# Patient Record
Sex: FEMALE | Race: BLACK OR AFRICAN AMERICAN | Employment: OTHER | URBAN - METROPOLITAN AREA
[De-identification: names, ages, dates, MRNs, and addresses within clinical notes are randomized per-mention and may not be internally consistent; named-entity substitution may affect disease eponyms.]

---

## 2017-06-30 ENCOUNTER — HOSPITAL ENCOUNTER (OUTPATIENT)
Dept: GENERAL RADIOLOGY | Age: 42
Discharge: HOME | End: 2017-06-30
Payer: MEDICARE

## 2017-06-30 ENCOUNTER — HOSPITAL ENCOUNTER (OUTPATIENT)
Dept: MRI IMAGING | Age: 42
Discharge: HOME | End: 2017-06-30
Payer: MEDICARE

## 2017-06-30 DIAGNOSIS — M51.37 DEGENERATION OF LUMBAR OR LUMBOSACRAL INTERVERTEBRAL DISC: ICD-10-CM

## 2017-06-30 PROCEDURE — 72148 MRI LUMBAR SPINE W/O DYE: CPT

## 2018-02-02 ENCOUNTER — HOSPITAL ENCOUNTER (EMERGENCY)
Age: 43
Discharge: HOME | End: 2018-02-02
Payer: MEDICARE

## 2018-02-02 VITALS
OXYGEN SATURATION: 97 % | HEIGHT: 67 IN | WEIGHT: 190 LBS | SYSTOLIC BLOOD PRESSURE: 112 MMHG | BODY MASS INDEX: 29.82 KG/M2 | HEART RATE: 104 BPM | TEMPERATURE: 98.4 F | RESPIRATION RATE: 18 BRPM | DIASTOLIC BLOOD PRESSURE: 74 MMHG

## 2018-02-02 DIAGNOSIS — T78.40XA ALLERGIC REACTION, INITIAL ENCOUNTER: Primary | ICD-10-CM

## 2018-02-02 PROCEDURE — 6370000000 HC RX 637 (ALT 250 FOR IP): Performed by: PHYSICIAN ASSISTANT

## 2018-02-02 PROCEDURE — 96374 THER/PROPH/DIAG INJ IV PUSH: CPT

## 2018-02-02 PROCEDURE — 2580000003 HC RX 258: Performed by: PHYSICIAN ASSISTANT

## 2018-02-02 PROCEDURE — S0028 INJECTION, FAMOTIDINE, 20 MG: HCPCS | Performed by: PHYSICIAN ASSISTANT

## 2018-02-02 PROCEDURE — 6360000002 HC RX W HCPCS: Performed by: PHYSICIAN ASSISTANT

## 2018-02-02 PROCEDURE — 96375 TX/PRO/DX INJ NEW DRUG ADDON: CPT

## 2018-02-02 PROCEDURE — 99282 EMERGENCY DEPT VISIT SF MDM: CPT

## 2018-02-02 PROCEDURE — 2500000003 HC RX 250 WO HCPCS: Performed by: PHYSICIAN ASSISTANT

## 2018-02-02 RX ORDER — KETOROLAC TROMETHAMINE 30 MG/ML
30 INJECTION, SOLUTION INTRAMUSCULAR; INTRAVENOUS ONCE
Status: COMPLETED | OUTPATIENT
Start: 2018-02-02 | End: 2018-02-02

## 2018-02-02 RX ORDER — SODIUM CHLORIDE 9 MG/ML
INJECTION, SOLUTION INTRAVENOUS CONTINUOUS
Status: DISCONTINUED | OUTPATIENT
Start: 2018-02-02 | End: 2018-02-02 | Stop reason: HOSPADM

## 2018-02-02 RX ORDER — PREDNISONE 20 MG/1
20 TABLET ORAL DAILY
Qty: 5 TABLET | Refills: 0 | Status: SHIPPED | OUTPATIENT
Start: 2018-02-02 | End: 2018-02-07

## 2018-02-02 RX ORDER — LORATADINE 10 MG/1
10 TABLET ORAL DAILY
Qty: 30 TABLET | Refills: 0 | Status: SHIPPED | OUTPATIENT
Start: 2018-02-02

## 2018-02-02 RX ORDER — DIPHENHYDRAMINE HYDROCHLORIDE 50 MG/ML
25 INJECTION INTRAMUSCULAR; INTRAVENOUS ONCE
Status: COMPLETED | OUTPATIENT
Start: 2018-02-02 | End: 2018-02-02

## 2018-02-02 RX ORDER — FAMOTIDINE 20 MG/1
20 TABLET, FILM COATED ORAL 2 TIMES DAILY
Qty: 20 TABLET | Refills: 0 | Status: SHIPPED | OUTPATIENT
Start: 2018-02-02

## 2018-02-02 RX ORDER — METHYLPREDNISOLONE SODIUM SUCCINATE 125 MG/2ML
125 INJECTION, POWDER, LYOPHILIZED, FOR SOLUTION INTRAMUSCULAR; INTRAVENOUS ONCE
Status: COMPLETED | OUTPATIENT
Start: 2018-02-02 | End: 2018-02-02

## 2018-02-02 RX ADMIN — SODIUM CHLORIDE: 9 INJECTION, SOLUTION INTRAVENOUS at 12:00

## 2018-02-02 RX ADMIN — Medication 15 ML: at 12:09

## 2018-02-02 RX ADMIN — KETOROLAC TROMETHAMINE 30 MG: 30 INJECTION, SOLUTION INTRAMUSCULAR at 13:02

## 2018-02-02 RX ADMIN — FAMOTIDINE 20 MG: 10 INJECTION, SOLUTION INTRAVENOUS at 12:01

## 2018-02-02 RX ADMIN — METHYLPREDNISOLONE SODIUM SUCCINATE 125 MG: 125 INJECTION, POWDER, FOR SOLUTION INTRAMUSCULAR; INTRAVENOUS at 12:01

## 2018-02-02 RX ADMIN — DIPHENHYDRAMINE HYDROCHLORIDE 25 MG: 50 INJECTION, SOLUTION INTRAMUSCULAR; INTRAVENOUS at 12:01

## 2018-02-02 ASSESSMENT — PAIN SCALES - GENERAL
PAINLEVEL_OUTOF10: 8
PAINLEVEL_OUTOF10: 4

## 2018-02-02 ASSESSMENT — ENCOUNTER SYMPTOMS
SHORTNESS OF BREATH: 0
RHINORRHEA: 0
EYE DISCHARGE: 0
COLOR CHANGE: 0
CONSTIPATION: 0
ABDOMINAL DISTENTION: 0
ABDOMINAL PAIN: 0
SORE THROAT: 0

## 2018-02-02 NOTE — ED PROVIDER NOTES
(36.9 °C) Oral 104 18 97 % 5' 7\" (1.702 m) 190 lb (86.2 kg)       Physical Exam   Constitutional: She is oriented to person, place, and time. She appears well-developed and well-nourished. HENT:   Head: Normocephalic. Eyes: EOM are normal. Pupils are equal, round, and reactive to light. Neck: Normal range of motion. Neck supple. No JVD present. No tracheal deviation present. Cardiovascular: Normal rate. Pulmonary/Chest: Effort normal. No respiratory distress. She has no wheezes. She has no rales. She exhibits no tenderness. Lungs sounds are clear in all fields no wheezes rales or rhonchi there is no accessory muscle use there is no signs of respiratory distress and room air saturation is 97%. Abdominal: She exhibits no distension and no mass. There is no tenderness. There is no guarding. Musculoskeletal: She exhibits no edema or deformity. Patient is able to with very steady gait there is no ataxia there is no dizziness. Neurological: She is oriented to person, place, and time. Coordination normal.   Skin: Rash noted. Patient has urticaria noted over dorsal surface of her hands and forearms chest and neck. There is no signs of allergic reaction within the mouth there is no blistering there is no tongue swelling there is no swelling to her lips. Psychiatric: She has a normal mood and affect.        DIAGNOSTIC RESULTS     EKG: All EKG's are interpreted by the Emergency Department Physician who either signs or Co-signs this chart in the absence of a cardiologist.        RADIOLOGY:   Non-plain film images such as CT, Ultrasound and MRI are read by the radiologist. Plain radiographic images are visualized and preliminarily interpreted by the emergency physician with the below findings:        Interpretation per the Radiologist below, if available at the time of this note:    No orders to display         ED BEDSIDE ULTRASOUND:   Performed by ED Physician - none    LABS:  Labs Reviewed - No data to display    All other labs were within normal range or not returned as of this dictation. EMERGENCY DEPARTMENT COURSE and DIFFERENTIAL DIAGNOSIS/MDM:   Vitals:    Vitals:    02/02/18 1135   BP: 112/74   Pulse: 104   Resp: 18   Temp: 98.4 °F (36.9 °C)   TempSrc: Oral   SpO2: 97%   Weight: 190 lb (86.2 kg)   Height: 5' 7\" (1.702 m)         ED Course      MDM  Number of Diagnoses or Management Options  Allergic reaction, initial encounter:   Diagnosis management comments: After medication in emergency murmur with Pepcid and Solu-Medrol and Benadryl rash is fading out at this time patient has no additional complaints no shortness of breath no dizziness no nausea or vomiting. There is no intraoral rashes or ulcerations. She'll be discharged home denies if she has worsening of her condition increasing shortness of breath difficulty swallowing worsening rash is not resolving she should return to the ER. CRITICAL CARE TIME   Total Critical Care time was 0 minutes, excluding separately reportable procedures. There was a high probability of clinically significant/life threatening deterioration in the patient's condition which required my urgent intervention. CONSULTS:  None    PROCEDURES:  Unless otherwise noted below, none     Procedures    FINAL IMPRESSION      1.  Allergic reaction, initial encounter          DISPOSITION/PLAN   DISPOSITION Decision To Discharge 02/02/2018 12:51:01 PM      PATIENT REFERRED TO:  Negrito Mo MD  [address] [place] [address] [phone]    In 2 days        DISCHARGE MEDICATIONS:  New Prescriptions    FAMOTIDINE (PEPCID) 20 MG TABLET    Take 1 tablet by mouth 2 times daily    LORATADINE (CLARITIN) 10 MG TABLET    Take 1 tablet by mouth daily    PREDNISONE (DELTASONE) 20 MG TABLET    Take 1 tablet by mouth daily for 5 doses          (Please note that portions of this note were completed with a voice recognition program.  Efforts were made to edit the dictations but

## 2018-04-11 PROBLEM — M46.1 SACROILIITIS, NOT ELSEWHERE CLASSIFIED (HCC): Status: ACTIVE | Noted: 2018-04-11

## 2018-05-24 ENCOUNTER — INITIAL CONSULT (OUTPATIENT)
Dept: OBGYN CLINIC | Age: 43
End: 2018-05-24
Payer: MEDICARE

## 2018-05-24 VITALS
WEIGHT: 220 LBS | DIASTOLIC BLOOD PRESSURE: 70 MMHG | SYSTOLIC BLOOD PRESSURE: 118 MMHG | HEIGHT: 66 IN | BODY MASS INDEX: 35.36 KG/M2

## 2018-05-24 DIAGNOSIS — Z01.419 WOMEN'S ANNUAL ROUTINE GYNECOLOGICAL EXAMINATION: Primary | ICD-10-CM

## 2018-05-24 DIAGNOSIS — Z11.51 SPECIAL SCREENING EXAMINATION FOR HUMAN PAPILLOMAVIRUS (HPV): ICD-10-CM

## 2018-05-24 DIAGNOSIS — Z12.31 SCREENING MAMMOGRAM, ENCOUNTER FOR: ICD-10-CM

## 2018-05-24 PROCEDURE — 99396 PREV VISIT EST AGE 40-64: CPT | Performed by: OBSTETRICS & GYNECOLOGY

## 2018-05-30 ASSESSMENT — ENCOUNTER SYMPTOMS
RECTAL PAIN: 0
ABDOMINAL DISTENTION: 0
ANAL BLEEDING: 0
ALLERGIC/IMMUNOLOGIC NEGATIVE: 1
RESPIRATORY NEGATIVE: 1
DIARRHEA: 0
ABDOMINAL PAIN: 0
CONSTIPATION: 0
BLOOD IN STOOL: 0
EYES NEGATIVE: 1
NAUSEA: 0
VOMITING: 0

## 2018-06-01 ENCOUNTER — HOSPITAL ENCOUNTER (OUTPATIENT)
Dept: GENERAL RADIOLOGY | Age: 43
Discharge: HOME | End: 2018-06-03
Payer: MEDICARE

## 2018-06-01 ENCOUNTER — HOSPITAL ENCOUNTER (OUTPATIENT)
Dept: WOMENS IMAGING | Age: 43
Discharge: HOME | End: 2018-06-03
Payer: MEDICARE

## 2018-06-01 DIAGNOSIS — M46.1 SACROILIITIS (HCC): ICD-10-CM

## 2018-06-01 DIAGNOSIS — Z12.31 SCREENING MAMMOGRAM, ENCOUNTER FOR: ICD-10-CM

## 2018-06-01 PROCEDURE — 72220 X-RAY EXAM SACRUM TAILBONE: CPT

## 2018-06-01 PROCEDURE — 77067 SCR MAMMO BI INCL CAD: CPT

## 2018-06-04 DIAGNOSIS — Z01.419 WOMEN'S ANNUAL ROUTINE GYNECOLOGICAL EXAMINATION: ICD-10-CM

## 2018-06-04 DIAGNOSIS — Z11.51 SPECIAL SCREENING EXAMINATION FOR HUMAN PAPILLOMAVIRUS (HPV): ICD-10-CM

## 2018-06-12 ENCOUNTER — TELEPHONE (OUTPATIENT)
Dept: OBGYN CLINIC | Age: 43
End: 2018-06-12

## 2019-07-16 ENCOUNTER — HOSPITAL ENCOUNTER (OUTPATIENT)
Dept: NON INVASIVE DIAGNOSTICS | Age: 44
Discharge: HOME | End: 2019-07-16
Payer: MEDICARE

## 2019-07-16 LAB
EKG ATRIAL RATE: 72 BPM
EKG P AXIS: 60 DEGREES
EKG P-R INTERVAL: 212 MS
EKG Q-T INTERVAL: 390 MS
EKG QRS DURATION: 68 MS
EKG QTC CALCULATION (BAZETT): 427 MS
EKG R AXIS: -13 DEGREES
EKG T AXIS: 4 DEGREES
EKG VENTRICULAR RATE: 72 BPM

## 2019-07-16 PROCEDURE — 93005 ELECTROCARDIOGRAM TRACING: CPT | Performed by: INTERNAL MEDICINE

## 2019-07-18 PROCEDURE — 93010 ELECTROCARDIOGRAM REPORT: CPT | Performed by: INTERNAL MEDICINE

## 2019-08-01 ENCOUNTER — OFFICE VISIT (OUTPATIENT)
Dept: CARDIOLOGY CLINIC | Age: 44
End: 2019-08-01
Payer: MEDICARE

## 2019-08-01 VITALS
OXYGEN SATURATION: 98 % | HEIGHT: 66 IN | SYSTOLIC BLOOD PRESSURE: 122 MMHG | RESPIRATION RATE: 16 BRPM | DIASTOLIC BLOOD PRESSURE: 68 MMHG | WEIGHT: 242.6 LBS | BODY MASS INDEX: 38.99 KG/M2 | HEART RATE: 107 BPM

## 2019-08-01 DIAGNOSIS — Z82.49 FAMILY HISTORY OF CORONARY ARTERY DISEASE: ICD-10-CM

## 2019-08-01 DIAGNOSIS — E66.01 MORBID OBESITY (HCC): ICD-10-CM

## 2019-08-01 DIAGNOSIS — R94.31 ABNORMAL EKG: ICD-10-CM

## 2019-08-01 PROCEDURE — 99204 OFFICE O/P NEW MOD 45 MIN: CPT | Performed by: INTERNAL MEDICINE

## 2019-08-01 PROCEDURE — G8417 CALC BMI ABV UP PARAM F/U: HCPCS | Performed by: INTERNAL MEDICINE

## 2019-08-01 PROCEDURE — 4004F PT TOBACCO SCREEN RCVD TLK: CPT | Performed by: INTERNAL MEDICINE

## 2019-08-01 PROCEDURE — G8427 DOCREV CUR MEDS BY ELIG CLIN: HCPCS | Performed by: INTERNAL MEDICINE

## 2019-08-01 NOTE — PROGRESS NOTES
activity: Not Currently   Lifestyle    Physical activity:     Days per week: None     Minutes per session: None    Stress: None   Relationships    Social connections:     Talks on phone: None     Gets together: None     Attends Yazidi service: None     Active member of club or organization: None     Attends meetings of clubs or organizations: None     Relationship status: None    Intimate partner violence:     Fear of current or ex partner: None     Emotionally abused: None     Physically abused: None     Forced sexual activity: None   Other Topics Concern    None   Social History Narrative    None       Family History   Adopted: Yes       Current Outpatient Medications   Medication Sig Dispense Refill    MULTIPLE VITAMIN PO Take by mouth      famotidine (PEPCID) 20 MG tablet Take 1 tablet by mouth 2 times daily 20 tablet 0    loratadine (CLARITIN) 10 MG tablet Take 1 tablet by mouth daily 30 tablet 0    tiZANidine (ZANAFLEX) 4 MG tablet TAKE 1 TABLET BY MOUTH TWICE DAILY AS NEEDED FOR SPASMS 60 tablet 0    oxyCODONE-acetaminophen (PERCOCET) 7.5-325 MG per tablet Take 1 tablet by mouth 2 times daily as needed for Pain . Earliest Fill Date: 12/8/17 60 tablet 0    oxyCODONE-acetaminophen (PERCOCET)  MG per tablet Take 1 tablet by mouth 2 times daily as needed for Pain . Earliest Fill Date: 5/24/17 4 tablet 0    fentaNYL (DURAGESIC) 12 MCG/HR Place 1 patch onto the skin every 72 hours . Earliest Fill Date: 3/30/17 10 patch 0    fentaNYL (DURAGESIC) 25 MCG/HR Place 1 patch onto the skin every 72 hours  For pain. 10 patch 0     No current facility-administered medications for this visit. Red dye; Flagyl [metronidazole]; and Vitamin b12    Review of Systems:  General ROS: negative  Psychological ROS: negative  Hematological and Lymphatic ROS: No history of blood clots or bleeding disorder.    Respiratory ROS: positive for - shortness of breath  Cardiovascular ROS: positive for - dyspnea on

## 2019-08-12 ENCOUNTER — HOSPITAL ENCOUNTER (OUTPATIENT)
Dept: NUCLEAR MEDICINE | Age: 44
Discharge: HOME | End: 2019-08-14
Payer: MEDICARE

## 2019-08-12 ENCOUNTER — HOSPITAL ENCOUNTER (OUTPATIENT)
Dept: NON INVASIVE DIAGNOSTICS | Age: 44
Discharge: HOME | End: 2019-08-12
Payer: MEDICARE

## 2019-08-12 DIAGNOSIS — E66.01 MORBID OBESITY (HCC): ICD-10-CM

## 2019-08-12 DIAGNOSIS — R94.31 ABNORMAL EKG: ICD-10-CM

## 2019-08-12 LAB
LV EF: 60 %
LVEF MODALITY: NORMAL

## 2019-08-12 PROCEDURE — 78452 HT MUSCLE IMAGE SPECT MULT: CPT

## 2019-08-12 PROCEDURE — 93017 CV STRESS TEST TRACING ONLY: CPT

## 2019-08-12 PROCEDURE — 6360000002 HC RX W HCPCS: Performed by: INTERNAL MEDICINE

## 2019-08-12 PROCEDURE — 2580000003 HC RX 258: Performed by: INTERNAL MEDICINE

## 2019-08-12 PROCEDURE — 3430000000 HC RX DIAGNOSTIC RADIOPHARMACEUTICAL: Performed by: INTERNAL MEDICINE

## 2019-08-12 PROCEDURE — A9502 TC99M TETROFOSMIN: HCPCS | Performed by: INTERNAL MEDICINE

## 2019-08-12 PROCEDURE — 93306 TTE W/DOPPLER COMPLETE: CPT

## 2019-08-12 RX ORDER — SODIUM CHLORIDE 0.9 % (FLUSH) 0.9 %
10 SYRINGE (ML) INJECTION PRN
Status: COMPLETED | OUTPATIENT
Start: 2019-08-12 | End: 2019-08-12

## 2019-08-12 RX ADMIN — TETROFOSMIN 34.7 MILLICURIE: 1.38 INJECTION, POWDER, LYOPHILIZED, FOR SOLUTION INTRAVENOUS at 12:10

## 2019-08-12 RX ADMIN — Medication 10 ML: at 10:35

## 2019-08-12 RX ADMIN — Medication 10 ML: at 12:10

## 2019-08-12 RX ADMIN — Medication 10 ML: at 12:11

## 2019-08-12 RX ADMIN — REGADENOSON 0.4 MG: 0.08 INJECTION, SOLUTION INTRAVENOUS at 12:10

## 2019-08-12 RX ADMIN — TETROFOSMIN 10.8 MILLICURIE: 1.38 INJECTION, POWDER, LYOPHILIZED, FOR SOLUTION INTRAVENOUS at 10:35

## 2019-08-26 ENCOUNTER — OFFICE VISIT (OUTPATIENT)
Dept: CARDIOLOGY CLINIC | Age: 44
End: 2019-08-26
Payer: MEDICARE

## 2019-08-26 VITALS
SYSTOLIC BLOOD PRESSURE: 122 MMHG | BODY MASS INDEX: 38.57 KG/M2 | HEART RATE: 82 BPM | DIASTOLIC BLOOD PRESSURE: 84 MMHG | WEIGHT: 240 LBS | OXYGEN SATURATION: 99 % | HEIGHT: 66 IN | RESPIRATION RATE: 20 BRPM

## 2019-08-26 DIAGNOSIS — E66.01 MORBID OBESITY (HCC): ICD-10-CM

## 2019-08-26 DIAGNOSIS — R94.31 ABNORMAL EKG: Primary | ICD-10-CM

## 2019-08-26 DIAGNOSIS — Z87.891 HISTORY OF TOBACCO ABUSE: ICD-10-CM

## 2019-08-26 PROCEDURE — G8427 DOCREV CUR MEDS BY ELIG CLIN: HCPCS | Performed by: INTERNAL MEDICINE

## 2019-08-26 PROCEDURE — 4004F PT TOBACCO SCREEN RCVD TLK: CPT | Performed by: INTERNAL MEDICINE

## 2019-08-26 PROCEDURE — G8417 CALC BMI ABV UP PARAM F/U: HCPCS | Performed by: INTERNAL MEDICINE

## 2019-08-26 PROCEDURE — 99214 OFFICE O/P EST MOD 30 MIN: CPT | Performed by: INTERNAL MEDICINE

## 2019-08-26 RX ORDER — CEPHALEXIN 500 MG/1
CAPSULE ORAL
Refills: 0 | COMMUNITY
Start: 2019-08-15

## 2019-08-26 RX ORDER — FUROSEMIDE 20 MG/1
TABLET ORAL
Refills: 2 | COMMUNITY
Start: 2019-08-08

## 2019-08-26 RX ORDER — SULFAMETHOXAZOLE AND TRIMETHOPRIM 800; 160 MG/1; MG/1
TABLET ORAL
Refills: 0 | COMMUNITY
Start: 2019-08-15

## 2019-08-26 NOTE — PROGRESS NOTES
fentaNYL (DURAGESIC) 25 MCG/HR Place 1 patch onto the skin every 72 hours  For pain. 10 patch 0     No current facility-administered medications for this visit. Red dye; Flagyl [metronidazole]; and Vitamin b12    Review of Systems:  General ROS: negative  Psychological ROS: negative  Hematological and Lymphatic ROS: No history of blood clots or bleeding disorder. Respiratory ROS: positive for - shortness of breath  Cardiovascular ROS: positive for - dyspnea on exertion and shortness of breath  Gastrointestinal ROS: no abdominal pain, change in bowel habits, or black or bloody stools  Genito-Urinary ROS: no dysuria, trouble voiding, or hematuria  Musculoskeletal ROS: negative  Neurological ROS: no TIA or stroke symptoms  Dermatological ROS: negative    VITALS:  Blood pressure 122/84, pulse 82, resp. rate 20, height 5' 6\" (1.676 m), weight 240 lb (108.9 kg), SpO2 99 %. Body mass index is 38.74 kg/m². Physical Examination:  General appearance - alert, well appearing, and in no distress  Mental status - alert, oriented to person, place, and time  Neck - Neck is supple, no JVD or carotid bruits. No thyromegaly or adenopathy. Chest - clear to auscultation, no wheezes, rales or rhonchi, symmetric air entry  Heart - normal rate, regular rhythm, normal S1, S2, no murmurs, rubs, clicks or gallops  Abdomen - soft, nontender, nondistended, no masses or organomegaly  Neurological - alert, oriented, normal speech, no focal findings or movement disorder noted  Extremities - peripheral pulses normal, no pedal edema, no clubbing or cyanosis  Skin - normal coloration and turgor, no rashes, no suspicious skin lesions noted      EKG: normal sinus rhythm, Q waves in inferior wall. No orders of the defined types were placed in this encounter. ASSESSMENT:     Diagnosis Orders   1. Abnormal EKG     2. Morbid obesity ([address]     3.  History of tobacco abuse           PLAN:     Patient will need to continue to follow up

## 2020-10-14 ENCOUNTER — HOSPITAL ENCOUNTER (OUTPATIENT)
Dept: MRI IMAGING | Age: 45
Discharge: HOME | End: 2020-10-16
Payer: MEDICARE

## 2020-10-14 PROCEDURE — 72148 MRI LUMBAR SPINE W/O DYE: CPT

## 2021-06-22 ENCOUNTER — TELEPHONE (OUTPATIENT)
Dept: PAIN MANAGEMENT | Age: 46
End: 2021-06-22
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
## 2021-06-23 ENCOUNTER — TELEPHONE (OUTPATIENT)
Dept: PAIN MANAGEMENT | Age: 46
End: 2021-06-23

## 2021-06-23 ENCOUNTER — VIRTUAL VISIT (OUTPATIENT)
Dept: PAIN MANAGEMENT | Age: 46
End: 2021-06-23
Payer: MEDICARE

## 2021-06-23 DIAGNOSIS — M54.16 LUMBAR RADICULOPATHY: Primary | ICD-10-CM

## 2021-06-23 DIAGNOSIS — M51.36 DDD (DEGENERATIVE DISC DISEASE), LUMBAR: ICD-10-CM

## 2021-06-23 PROCEDURE — 99442 PR PHYS/QHP TELEPHONE EVALUATION 11-20 MIN: CPT | Performed by: PAIN MEDICINE

## 2021-06-23 NOTE — TELEPHONE ENCOUNTER
MRI Lumbar w/out contrast order faxed to [facility]UEL ([phone]), patient may now call [facility] to schedule ([phone]). Tried to call patient to inform her, phone number not in service. Letter mailed to patient to inform her she may now call [facility] to schedule.        No precert required-Medicare

## 2021-06-23 NOTE — PROGRESS NOTES
Melanie Davis  (1975)    2021    TELEHEALTH EVALUATION -- Audio Only (During [facility]- public health emergency)    Due to [place] 19 outbreak, patient's office visit was converted to a virtual visit. Patient was contacted and agreed to proceed with a audio only telehealth service. Patient understands that this encounter is a billable visit and that insurance coverage and co-pays are up to their individual insurance plans. At the beginning of this telehealth encounter, I verified with the patient their name and date of birth. Verbal consent for telehealth encounter was obtained. Subjective:       Chief Complaint   Patient presents with    Back Pain       Melanie aDvis is 51 y.o. female who complains today of:     Colletta Roads had a telehealth visit today. Ablation has not helped. She numbness tingling the legs a lot of pain, electricity feeling in the legs. She has known degenerative changes lower lumbar spine. No back surgery. Plan: We will order new MRI lumbar spine without contrast to evaluate for new disc herniation. She understands a plan, is in agreement. We will see what the MRI shows.   Questions were answered    Allergies:  Red dye, Flagyl [metronidazole], and Vitamin b12    History:  Past Medical History:   Diagnosis Date    Abnormal EKG 2019    Anemia     Chronic back pain     Family history of coronary artery disease 2019    History of tobacco abuse 2019    Hypotension     Morbid obesity ([address] 2019    Osteoarthritis      Past Surgical History:   Procedure Laterality Date     SECTION      CYST REMOVAL  2015    FACE LEFT SIDE    HYSTERECTOMY, TOTAL ABDOMINAL      RAAD/ one ovary remains     Family History   Adopted: Yes     Social History     Socioeconomic History    Marital status:      Spouse name: Not on file    Number of children: Not on file    Years of education: Not on file    Highest education level: Not on file   Occupational History    Not on file   Tobacco Use    Smoking status: Former Smoker     Packs/day: 0.50     Years: 14.00     Pack years: 7.00     Types: Cigarettes     Quit date: 10/2018     Years since quittin.7    Smokeless tobacco: Current User     Types: Snuff, Chew   Vaping Use    Vaping Use: Never used   Substance and Sexual Activity    Alcohol use: Yes     Alcohol/week: 0.0 standard drinks     Comment: RARELY    Drug use: No    Sexual activity: Not Currently   Other Topics Concern    Not on file   Social History Narrative    Not on file     Social Determinants of Health     Financial Resource Strain:     Difficulty of Paying Living Expenses:    Food Insecurity:     Worried About Running Out of Food in the Last Year:     [address] in the Last Year:    Transportation Needs:     Lack of Transportation (Medical):      Lack of Transportation (Non-Medical):    Physical Activity:     Days of Exercise per Week:     Minutes of Exercise per Session:    Stress:     Feeling of Stress :    Social Connections:     Frequency of Communication with Friends and Family:     Frequency of Social Gatherings with Friends and Family:     Attends Mosque Services:     Active Member of Clubs or Organizations:     Attends Club or Organization Meetings:     Marital Status:    Intimate Partner Violence:     Fear of Current or Ex-Partner:     Emotionally Abused:     Physically Abused:     Sexually Abused:        Current Outpatient Medications on File Prior to Visit   Medication Sig Dispense Refill    furosemide (LASIX) 20 MG tablet TK 1 T PO Q 12 H PRF SWELLING OF FEET OR LEGS  2    cephALEXin (KEFLEX) 500 MG capsule TK ONE C PO QID  0    sulfamethoxazole-trimethoprim (BACTRIM DS;SEPTRA DS) 800-160 MG per tablet TK 1 T PO BID  0    MULTIPLE VITAMIN PO Take by mouth      famotidine (PEPCID) 20 MG tablet Take 1 tablet by mouth 2 times daily 20 tablet 0    loratadine (CLARITIN) 10 MG tablet Take 1 tablet by mouth daily 30 tablet 0    oxyCODONE-acetaminophen (PERCOCET) 7.5-325 MG per tablet Take 1 tablet by mouth 2 times daily as needed for Pain . Earliest Fill Date: 12/8/17 60 tablet 0    tiZANidine (ZANAFLEX) 4 MG tablet TAKE 1 TABLET BY MOUTH TWICE DAILY AS NEEDED FOR SPASMS 60 tablet 0    fentaNYL (DURAGESIC) 12 MCG/HR Place 1 patch onto the skin every 72 hours . Earliest Fill Date: 3/30/17 10 patch 0    fentaNYL (DURAGESIC) 25 MCG/HR Place 1 patch onto the skin every 72 hours  For pain. 10 patch 0     No current facility-administered medications on file prior to visit. HPI    Review of Systems    Objective:     Vitals: There were no vitals taken for this visit. PHYSICAL EXAMINATION:    [x] Alert  [x] Oriented to person/place/time  [x] No apparent distress      [x] Mood and affect normal  [x] Recent and remote memory intact  [x] Judgement and insight normal     [] OTHER:      Due to this being a TeleHealth encounter, evaluation of the following organ systems is limited: Vitals/Constitutional/EENT/Resp/CV/GI//MS/Neuro/Skin/Heme-Lymph-Imm. Assessment:      Diagnosis Orders   1. Lumbar radiculopathy     2. DDD (degenerative disc disease), lumbar         Plan:          No orders of the defined types were placed in this encounter. No orders of the defined types were placed in this encounter. Follow up:  No follow-ups on file. Emily Nath DO      >50% of minute appointment was spent with discussion, addressing questions and concerns, including prognosis, treatment options, patient education, and recommendations.       [id number]}    Pursuant to the emergency declaration under the [facility] waiver authority and the [employer] and [employer] Act, this Virtual  Visit was conducted, with patient's consent, to reduce the patient's risk of exposure to COVID-19 and provide continuity of care for an established patient. Services were provided through an audio discussion to substitute for in-person clinic visit.

## 2021-07-06 ENCOUNTER — VIRTUAL VISIT (OUTPATIENT)
Dept: PAIN MANAGEMENT | Age: 46
End: 2021-07-06
Payer: MEDICARE

## 2021-07-06 DIAGNOSIS — M51.36 DDD (DEGENERATIVE DISC DISEASE), LUMBAR: ICD-10-CM

## 2021-07-06 DIAGNOSIS — M54.16 LUMBAR RADICULOPATHY: Primary | ICD-10-CM

## 2021-07-06 PROCEDURE — 99442 PR PHYS/QHP TELEPHONE EVALUATION 11-20 MIN: CPT | Performed by: PAIN MEDICINE

## 2021-07-06 ASSESSMENT — ENCOUNTER SYMPTOMS
CONSTIPATION: 0
NAUSEA: 0
DIARRHEA: 0
BACK PAIN: 0
SHORTNESS OF BREATH: 0

## 2021-07-06 NOTE — PROGRESS NOTES
Sherry Romberg  (1975)    2021    TELEHEALTH EVALUATION -- Audio Only (During [facility]-07 public health emergency)    Due to [place] 19 outbreak, patient's office visit was converted to a virtual visit. Patient was contacted and agreed to proceed with a audio only telehealth service. Patient understands that this encounter is a billable visit and that insurance coverage and co-pays are up to their individual insurance plans. At the beginning of this telehealth encounter, I verified with the patient their name and date of birth. Verbal consent for telehealth encounter was obtained. Subjective:       Chief Complaint   Patient presents with    Back Pain       Sherry Romberg is 51 y.o. female who complains today of:     Erika Carrolllin had a telehealth visit today. She has pain her back going down the legs. Standing walking bothers her. She had her MRI on July 3. We reviewed the results today. She has degenerative changes at L4-5 with some mild to moderate narrowing significant facet arthropathy and disc bulging. She has some significant significant degenerative disc disease and narrowing at L5-S1. Plan: We discussed options in detail- clinically she has a L4-5 radiculitis. She does not take blood thinners. She has failed conservative treatment in the past.  We will set her up for bilateral L4-5 transforaminal epidural injections to help her radicular pain. She understands plan, is in agreement.     Allergies:  Red dye, Flagyl [metronidazole], and Vitamin b12    History:  Past Medical History:   Diagnosis Date    Abnormal EKG 2019    Anemia     Chronic back pain     Family history of coronary artery disease 2019    History of tobacco abuse 2019    Hypotension     Morbid obesity ([address] 2019    Osteoarthritis      Past Surgical History:   Procedure Laterality Date     SECTION      CYST REMOVAL  2015    FACE LEFT SIDE    HYSTERECTOMY, TOTAL ABDOMINAL      RAAD/ one ovary remains     Family History   Adopted: Yes     Social History     Socioeconomic History    Marital status:      Spouse name: Not on file    Number of children: Not on file    Years of education: Not on file    Highest education level: Not on file   Occupational History    Not on file   Tobacco Use    Smoking status: Former Smoker     Packs/day: 0.50     Years: 14.00     Pack years: 7.00     Types: Cigarettes     Quit date: 10/2018     Years since quittin.7    Smokeless tobacco: Current User     Types: Snuff, Chew   Vaping Use    Vaping Use: Never used   Substance and Sexual Activity    Alcohol use: Yes     Alcohol/week: 0.0 standard drinks     Comment: RARELY    Drug use: No    Sexual activity: Not Currently   Other Topics Concern    Not on file   Social History Narrative    Not on file     Social Determinants of Health     Financial Resource Strain:     Difficulty of Paying Living Expenses:    Food Insecurity:     Worried About Running Out of Food in the Last Year:     [address] in the Last Year:    Transportation Needs:     Lack of Transportation (Medical):      Lack of Transportation (Non-Medical):    Physical Activity:     Days of Exercise per Week:     Minutes of Exercise per Session:    Stress:     Feeling of Stress :    Social Connections:     Frequency of Communication with Friends and Family:     Frequency of Social Gatherings with Friends and Family:     Attends Restorationist Services:     Active Member of Clubs or Organizations:     Attends Club or Organization Meetings:     Marital Status:    Intimate Partner Violence:     Fear of Current or Ex-Partner:     Emotionally Abused:     Physically Abused:     Sexually Abused:        Current Outpatient Medications on File Prior to Visit   Medication Sig Dispense Refill    furosemide (LASIX) 20 MG tablet TK 1 T PO Q 12 H PRF SWELLING OF FEET OR LEGS  2    cephALEXin (KEFLEX) 500 MG capsule TK ONE C PO QID  0    sulfamethoxazole-trimethoprim (BACTRIM DS;SEPTRA DS) 800-160 MG per tablet TK 1 T PO BID  0    MULTIPLE VITAMIN PO Take by mouth      famotidine (PEPCID) 20 MG tablet Take 1 tablet by mouth 2 times daily 20 tablet 0    loratadine (CLARITIN) 10 MG tablet Take 1 tablet by mouth daily 30 tablet 0    oxyCODONE-acetaminophen (PERCOCET) 7.5-325 MG per tablet Take 1 tablet by mouth 2 times daily as needed for Pain . Earliest Fill Date: 12/8/17 60 tablet 0    tiZANidine (ZANAFLEX) 4 MG tablet TAKE 1 TABLET BY MOUTH TWICE DAILY AS NEEDED FOR SPASMS 60 tablet 0    fentaNYL (DURAGESIC) 12 MCG/HR Place 1 patch onto the skin every 72 hours . Earliest Fill Date: 3/30/17 10 patch 0    fentaNYL (DURAGESIC) 25 MCG/HR Place 1 patch onto the skin every 72 hours  For pain. 10 patch 0     No current facility-administered medications on file prior to visit. HPI    Review of Systems   Constitutional: Negative for fever. HENT: Negative for hearing loss. Respiratory: Negative for shortness of breath. Gastrointestinal: Negative for constipation, diarrhea and nausea. Genitourinary: Negative for difficulty urinating. Musculoskeletal: Negative for back pain and neck pain. Skin: Negative for rash. Neurological: Negative for headaches. Hematological: Does not bruise/bleed easily. Psychiatric/Behavioral: Negative for sleep disturbance. Objective:     Vitals: There were no vitals taken for this visit. PHYSICAL EXAMINATION:    [x] Alert  [x] Oriented to person/place/time  [x] No apparent distress      [x] Mood and affect normal  [x] Recent and remote memory intact  [x] Judgement and insight normal     [] OTHER:      Due to this being a TeleHealth encounter, evaluation of the following organ systems is limited: Vitals/Constitutional/EENT/Resp/CV/GI//MS/Neuro/Skin/Heme-Lymph-Imm. Assessment:      Diagnosis Orders   1. Lumbar radiculopathy  SC NJX AA&/STRD TFRML EPI LUMBAR/SACRAL 1 LEVEL   2.  DDD (degenerative disc disease), lumbar         Plan:          No orders of the defined types were placed in this encounter. Orders Placed This Encounter   Procedures    RI NJX AA&/STRD TFRML EPI LUMBAR/SACRAL 1 LEVEL     B L4-5 augusto     Standing Status:   Future     Standing Expiration Date:   10/4/2021         Follow up:  No follow-ups on file. Abran Laguerre DO      >50% of minute appointment was spent with discussion, addressing questions and concerns, including prognosis, treatment options, patient education, and recommendations. [id number]}    Pursuant to the emergency declaration under the [facility], [facility] waiver authority and the [employer] and [employer] Act, this Virtual  Visit was conducted, with patient's consent, to reduce the patient's risk of exposure to COVID-19 and provide continuity of care for an established patient. Services were provided through an audio discussion to substitute for in-person clinic visit.

## 2021-07-08 ENCOUNTER — PROCEDURE VISIT (OUTPATIENT)
Dept: PAIN MANAGEMENT | Age: 46
End: 2021-07-08
Payer: MEDICARE

## 2021-07-08 DIAGNOSIS — M54.16 LUMBAR RADICULOPATHY: Primary | ICD-10-CM

## 2021-07-08 PROCEDURE — 64483 NJX AA&/STRD TFRM EPI L/S 1: CPT | Performed by: PAIN MEDICINE

## 2021-07-08 RX ORDER — DEXAMETHASONE SODIUM PHOSPHATE 10 MG/ML
10 INJECTION, EMULSION INTRAMUSCULAR; INTRAVENOUS ONCE
Status: COMPLETED | OUTPATIENT
Start: 2021-07-08 | End: 2021-07-08

## 2021-07-08 RX ORDER — BACTERIOSTATIC SODIUM CHLORIDE 0.9 %
1 VIAL (ML) INJECTION ONCE
Status: SHIPPED | OUTPATIENT
Start: 2021-07-08

## 2021-07-08 RX ORDER — SODIUM CHLORIDE 9 MG/ML
1 INJECTION INTRAVENOUS ONCE
Status: COMPLETED | OUTPATIENT
Start: 2021-07-08 | End: 2021-07-08

## 2021-07-08 RX ADMIN — DEXAMETHASONE SODIUM PHOSPHATE 10 MG: 10 INJECTION, EMULSION INTRAMUSCULAR; INTRAVENOUS at 14:37

## 2021-07-08 RX ADMIN — SODIUM CHLORIDE 1 ML: 9 INJECTION INTRAVENOUS at 15:50

## 2021-07-08 NOTE — PROGRESS NOTES
[facility].  Spine Surgery  Advanced Pain Management      Patient Name: Jesus Bhandari : 1975  Date: 2021   Physician: Agata Saucedo DO      Jesus Bhandari  is here today for interventional pain management. Positive bilateral straight leg raise. Preoperatively, the patient presents with radicular pain in the affected area as per history and exam. Patient has failed NSAIDs, PT, and conservative treatment. Patient has significant psychological and functional impairment due to this condition. Standard ASIPP guidelines were followed and sterile technique used. Area was cleaned with Betadine x3. Informed consent was obtained. Fluoroscopic guidance was used for this procedure. TRANSFORAMINAL EPIDURAL  There was limited spread of contrast in the anterior epidural space and around the exiting nerve root. The 6 oclock position of the pedicle was identified. Multiple views of fluoroscopy including lateral were used to confirm accurate needle placement of depth. Live fluoroscopy was used when injecting contrast to ensure no subdural or vascular spread. In total, approximately 5 mg of Dexamethasone and 1.0 ml of 0.9cc of normal saline was injected slowly without difficulty. Patient tolerated the procedure well, no obvious complications occurred during the procedure. Patient was appropriately monitored and discharged home in stable condition with their usual motor strength. Post Op instructions were given to patient. Patient will resume blood thinners after procedure if they stopped them before procedure. Relevant imaging was reviewed with patient. [x] Bilateral [] T12-L1 [] L3-4        [] L1-2 [x] L4-5       [] Right [] L2-3 [] L5-S1                [] Left                  Agata Saucedo DO      [address], [facility]  Phone [phone]/TEL [email or web address]

## 2021-07-28 ENCOUNTER — TELEPHONE (OUTPATIENT)
Dept: PAIN MANAGEMENT | Age: 46
End: 2021-07-28

## 2021-07-28 NOTE — TELEPHONE ENCOUNTER
Patient called stating that she is in more pain than she was prior to her injections. She has not gotten any relief. She is wondering what she should do?

## 2021-07-29 NOTE — TELEPHONE ENCOUNTER
Pt states it is in her right lower side of her back, going into her groin and straight down her leg.

## 2021-08-02 ENCOUNTER — PROCEDURE VISIT (OUTPATIENT)
Dept: PAIN MANAGEMENT | Age: 46
End: 2021-08-02
Payer: MEDICARE

## 2021-08-02 DIAGNOSIS — M46.1 SACROILIITIS, NOT ELSEWHERE CLASSIFIED (HCC): Primary | ICD-10-CM

## 2021-08-02 PROCEDURE — 27096 INJECT SACROILIAC JOINT: CPT | Performed by: PAIN MEDICINE

## 2021-08-02 PROCEDURE — 99213 OFFICE O/P EST LOW 20 MIN: CPT | Performed by: PAIN MEDICINE

## 2021-08-02 PROCEDURE — 4004F PT TOBACCO SCREEN RCVD TLK: CPT | Performed by: PAIN MEDICINE

## 2021-08-02 PROCEDURE — G8417 CALC BMI ABV UP PARAM F/U: HCPCS | Performed by: PAIN MEDICINE

## 2021-08-02 PROCEDURE — G8427 DOCREV CUR MEDS BY ELIG CLIN: HCPCS | Performed by: PAIN MEDICINE

## 2021-08-02 RX ORDER — LIDOCAINE HYDROCHLORIDE 10 MG/ML
2 INJECTION, SOLUTION EPIDURAL; INFILTRATION; INTRACAUDAL; PERINEURAL ONCE
Status: COMPLETED | OUTPATIENT
Start: 2021-08-02 | End: 2021-08-02

## 2021-08-02 RX ORDER — BETAMETHASONE SODIUM PHOSPHATE AND BETAMETHASONE ACETATE 3; 3 MG/ML; MG/ML
6 INJECTION, SUSPENSION INTRA-ARTICULAR; INTRALESIONAL; INTRAMUSCULAR; SOFT TISSUE ONCE
Status: COMPLETED | OUTPATIENT
Start: 2021-08-02 | End: 2021-08-02

## 2021-08-02 RX ADMIN — BETAMETHASONE SODIUM PHOSPHATE AND BETAMETHASONE ACETATE 6 MG: 3; 3 INJECTION, SUSPENSION INTRA-ARTICULAR; INTRALESIONAL; INTRAMUSCULAR; SOFT TISSUE at 13:56

## 2021-08-02 RX ADMIN — LIDOCAINE HYDROCHLORIDE 2 ML: 10 INJECTION, SOLUTION EPIDURAL; INFILTRATION; INTRACAUDAL; PERINEURAL at 13:56

## 2021-08-02 ASSESSMENT — ENCOUNTER SYMPTOMS
NAUSEA: 0
CONSTIPATION: 0
DIARRHEA: 0
SHORTNESS OF BREATH: 0
BACK PAIN: 0

## 2021-08-02 NOTE — PROGRESS NOTES
[facility]  Neurosurgery and Pain [facility]  [address] [place] [address]., [address] [place] [address] 82: [phone]  F: [phone]      Patient Name: Naomi Roman  : 1975     Date:  2021      Physician: DO Naomi Callahan is here today for interventional pain management. Preoperatively, the patient presents with SI joint mediated pain, as per history and exam. Patient has failed NSAIDs, PT, and conservative treatment. Patient has significant psychological and functional impairment due to this condition. Standard ASI guidelines were followed and sterile technique used. Area was cleaned with Betadine x3. Informed consent was obtained. Fluoroscopic guidance was used for this procedure. S.I. JOINT:  Right  Appropriate length spinal needle was advanced to the S.I. Joint. Negative aspiration was achieved. In total, approximately 6mg of Betamethasone and 2ml of 1% preservative free Lidocaine was injected without difficulty. Patient tolerated the procedure well, no obvious complications occurred during the procedure. Patient was appropriately monitored and discharged home in stable condition with their usual motor strength. Post Op Instructions were given to patient. Relevant and recent imaging reviewed with patient today. Germain Mart, [facility]  Neurosurgery and [facility]  [address] [place] [address]., [address] [place], [place] [address] 82: [phone]  F: [phone]      Patient Name: Naomi Roman  : 1975     Date:  2021      Physician: DO Naomi Callahan is here today for interventional pain management. Preoperatively, the patient presents with SI joint mediated pain, as per history and exam. Patient has failed NSAIDs, PT, and conservative treatment.  Patient has significant psychological and functional impairment due to this condition. Standard ASIPP guidelines were followed and sterile technique used. Area was cleaned with Betadine x3. Informed consent was obtained. Fluoroscopic guidance was used for this procedure. S.I. JOINT:  Right  Appropriate length spinal needle was advanced to the S.I. Joint. Negative aspiration was achieved. In total, approximately 6mg of Betamethasone and 2ml of 1% preservative free Lidocaine was injected without difficulty. Patient tolerated the procedure well, no obvious complications occurred during the procedure. Patient was appropriately monitored and discharged home in stable condition with their usual motor strength. Post Op Instructions were given to patient. Relevant and recent imaging reviewed with patient today.                                       Christos Zendejas DO

## 2021-08-02 NOTE — PROGRESS NOTES
Quit date: 10/2018     Years since quittin.8    Smokeless tobacco: Current User     Types: Snuff, Chew   Vaping Use    Vaping Use: Never used   Substance and Sexual Activity    Alcohol use: Yes     Alcohol/week: 0.0 standard drinks     Comment: RARELY    Drug use: No    Sexual activity: Not Currently   Other Topics Concern    Not on file   Social History Narrative    Not on file     Social Determinants of Health     Financial Resource Strain:     Difficulty of Paying Living Expenses:    Food Insecurity:     Worried About Running Out of Food in the Last Year:     [address] in the Last Year:    Transportation Needs:     Lack of Transportation (Medical):  Lack of Transportation (Non-Medical):    Physical Activity:     Days of Exercise per Week:     Minutes of Exercise per Session:    Stress:     Feeling of Stress :    Social Connections:     Frequency of Communication with Friends and Family:     Frequency of Social Gatherings with Friends and Family:     Attends Protestant Services:     Active Member of Clubs or Organizations:     Attends Club or Organization Meetings:     Marital Status:    Intimate Partner Violence:     Fear of Current or Ex-Partner:     Emotionally Abused:     Physically Abused:     Sexually Abused:        Current Outpatient Medications on File Prior to Visit   Medication Sig Dispense Refill    furosemide (LASIX) 20 MG tablet TK 1 T PO Q 12 H PRF SWELLING OF FEET OR LEGS  2    cephALEXin (KEFLEX) 500 MG capsule TK ONE C PO QID  0    sulfamethoxazole-trimethoprim (BACTRIM DS;SEPTRA DS) 800-160 MG per tablet TK 1 T PO BID  0    MULTIPLE VITAMIN PO Take by mouth      famotidine (PEPCID) 20 MG tablet Take 1 tablet by mouth 2 times daily 20 tablet 0    loratadine (CLARITIN) 10 MG tablet Take 1 tablet by mouth daily 30 tablet 0    oxyCODONE-acetaminophen (PERCOCET) 7.5-325 MG per tablet Take 1 tablet by mouth 2 times daily as needed for Pain .  Earliest Fill Date: 12/8/17 60 tablet 0    tiZANidine (ZANAFLEX) 4 MG tablet TAKE 1 TABLET BY MOUTH TWICE DAILY AS NEEDED FOR SPASMS 60 tablet 0    fentaNYL (DURAGESIC) 12 MCG/HR Place 1 patch onto the skin every 72 hours . Earliest Fill Date: 3/30/17 10 patch 0    fentaNYL (DURAGESIC) 25 MCG/HR Place 1 patch onto the skin every 72 hours  For pain. 10 patch 0     Current Facility-Administered Medications on File Prior to Visit   Medication Dose Route Frequency Provider Last Rate Last Admin    iopamidol (ISOVUE-370) 76 % injection 1 mL  1 mL Other ONCE PRN Claretha Cera, DO        sodium chloride bacteriostatic 0.9 % injection 1 mL  1 mL Injection Once Claretha Cera, DO               HPI    Review of Systems   Constitutional: Negative for fever. HENT: Negative for hearing loss. Respiratory: Negative for shortness of breath. Gastrointestinal: Negative for constipation, diarrhea and nausea. Genitourinary: Negative for difficulty urinating. Musculoskeletal: Negative for back pain and neck pain. Skin: Negative for rash. Neurological: Negative for headaches. Hematological: Does not bruise/bleed easily. Psychiatric/Behavioral: Negative for sleep disturbance. Objective:     Vitals: There were no vitals taken for this visit. Height 66 inches, weight 215 pounds, respirations 16    Physical Exam  Patient alert and oriented times three, recent and remote memory intact, mood and affect normal, judgement and insight normal. Strength functional for ambulation. Balance and coordinational functional. Visualized skin intact. No visualized cyanosis, pulses intact. Cranial nerves 2-12 grossly intact. No obvious upper motor neuron signs seen. Sensation intact to light touch. Pain Over R S I joint with (+) provacative manuvers. SLR Negative. Assessment:      Diagnosis Orders   1.  Sacroiliitis, not elsewhere classified ([facility] [address]  IN INJECT SI JOINT ARTHRGRPHY&/ANES/STEROID W/IMAGE       Plan:

## 2021-10-11 ENCOUNTER — TELEPHONE (OUTPATIENT)
Dept: PAIN MANAGEMENT | Age: 46
End: 2021-10-11

## 2021-10-11 DIAGNOSIS — M54.12 CERVICAL RADICULITIS: Primary | ICD-10-CM

## 2021-10-11 NOTE — TELEPHONE ENCOUNTER
Tried to call back number is out of service Dr Nora Weiss is ordering the MRI Cervical needs to find out where she wants to go and also Dr Nora Weiss is wondering if she wants a steroid pack called in

## 2021-10-11 NOTE — TELEPHONE ENCOUNTER
Patient called stating two to three days ago from 10/11 she began experiencing extreme pain in her low back centralized at top of both hips. The pain shoots down both legs and patient describes as hot and inflamed. Patient rates the pain at a 9 and that is getting significantly harder to walk. Patient asks Dr. Do Novak what she should do. Patient also states one day ago she began experiencing pain in left arm and tingling down to her finger tips.

## 2021-10-14 RX ORDER — METHYLPREDNISOLONE 4 MG/1
TABLET ORAL
Qty: 1 KIT | Refills: 0 | Status: SHIPPED | OUTPATIENT
Start: 2021-10-14 | End: 2021-10-20

## 2021-10-14 NOTE — TELEPHONE ENCOUNTER
Updated phone number in chart. Patient given [facility] scheduling number for MRI. Patient also states she would like Dr. Frantz Rangel to send a steroid pack.

## 2021-11-10 ENCOUNTER — HOSPITAL ENCOUNTER (OUTPATIENT)
Dept: MRI IMAGING | Age: 46
Discharge: HOME | End: 2021-11-12
Payer: MEDICARE

## 2021-11-10 DIAGNOSIS — M54.12 CERVICAL RADICULITIS: ICD-10-CM

## 2021-11-10 PROCEDURE — 72141 MRI NECK SPINE W/O DYE: CPT

## 2021-11-29 ENCOUNTER — TELEPHONE (OUTPATIENT)
Dept: PAIN MANAGEMENT | Age: 46
End: 2021-11-29

## 2021-11-29 NOTE — TELEPHONE ENCOUNTER
Does she need authorization for lumbar RFA? Claribel Aguilera Cervical MRI showed moderate arthritis and some mild to moderate nerve narrowing. But nothing severe. No major disc herniations.

## 2021-12-01 NOTE — TELEPHONE ENCOUNTER
Patient called and now knows the MRI results. She is asking if we can schedule her RFA? Does she need Authorization?

## 2021-12-01 NOTE — TELEPHONE ENCOUNTER
We need percentage of pain relief from her previous RFA's to determine if she can have the repeat RFA's.     Please route back to pre auth pool once percentage of pain relief is obtained

## 2021-12-07 ENCOUNTER — OFFICE VISIT (OUTPATIENT)
Dept: PAIN MANAGEMENT | Age: 46
End: 2021-12-07
Payer: MEDICARE

## 2021-12-07 DIAGNOSIS — M47.817 LUMBOSACRAL SPONDYLOSIS WITHOUT MYELOPATHY: Primary | ICD-10-CM

## 2021-12-07 PROCEDURE — 64635 DESTROY LUMB/SAC FACET JNT: CPT | Performed by: PAIN MEDICINE

## 2021-12-07 PROCEDURE — 64636 DESTROY L/S FACET JNT ADDL: CPT | Performed by: PAIN MEDICINE

## 2021-12-07 RX ORDER — BETAMETHASONE SODIUM PHOSPHATE AND BETAMETHASONE ACETATE 3; 3 MG/ML; MG/ML
6 INJECTION, SUSPENSION INTRA-ARTICULAR; INTRALESIONAL; INTRAMUSCULAR; SOFT TISSUE ONCE
Status: COMPLETED | OUTPATIENT
Start: 2021-12-07 | End: 2021-12-07

## 2021-12-07 RX ORDER — LIDOCAINE HYDROCHLORIDE 10 MG/ML
10 INJECTION, SOLUTION EPIDURAL; INFILTRATION; INTRACAUDAL; PERINEURAL ONCE
Status: COMPLETED | OUTPATIENT
Start: 2021-12-07 | End: 2021-12-07

## 2021-12-07 RX ADMIN — BETAMETHASONE SODIUM PHOSPHATE AND BETAMETHASONE ACETATE 6 MG: 3; 3 INJECTION, SUSPENSION INTRA-ARTICULAR; INTRALESIONAL; INTRAMUSCULAR; SOFT TISSUE at 10:16

## 2021-12-07 RX ADMIN — LIDOCAINE HYDROCHLORIDE 10 MG: 10 INJECTION, SOLUTION EPIDURAL; INFILTRATION; INTRACAUDAL; PERINEURAL at 10:16

## 2021-12-07 NOTE — PROGRESS NOTES
[facility]  Neurosurgery and Pain [address] [facility] [place] 82: [phone]  F: [phone]      Lumbar Radio Frequency Ablation     Provider: Jazz Lyle DO          Patient Name: Anna Sotelo : 1975        Date: 2021      Anna Sotelo is here today for interventional pain management. Standard ASIPP guidelines were followed and sterile technique used. Area was cleaned with Betadine x3. Informed consent was obtained. Fluoroscopic guidance was used for this procedure. Multiple views of fluoroscopy were used during procedure to assist with needle placement. Appropriate sized RF 10mm active tip needle was used and advance to appropriate anatomic location. There was appropriate multifidus contraction noted with motor stimulation at 2 Hz between 0.5-1.5 volts. No limb or gluteal contraction was noted taking it up to 3.5 volts. Prior to lesioning at 80 degrees Celsius for 90 seconds, approximately 0.75mg/1mg of Celestone and ½ cc of 1% preservative free Lidocaine was injected. Impedance was between 200-500 ohms during the procedure. Patient tolerated the procedure well, no obvious complications occurred during the procedure. Patient was appropriately monitored and discharged home in stable condition with their usual motor strength. Post Op instructions were given to patient.           [x] Bilateral [] T11 [] L1 [] S1     [] T12 [] L2 [] S2    [] Right  [x] L3 [] S3      [x] L4 [] S4    [] Left  [x] L5                              Jazz Lyle DO

## 2022-01-27 ENCOUNTER — VIRTUAL VISIT (OUTPATIENT)
Dept: PAIN MANAGEMENT | Age: 47
End: 2022-01-27
Payer: MEDICARE

## 2022-01-27 DIAGNOSIS — M47.817 LUMBOSACRAL SPONDYLOSIS WITHOUT MYELOPATHY: Primary | ICD-10-CM

## 2022-01-27 DIAGNOSIS — G89.4 CHRONIC PAIN SYNDROME: ICD-10-CM

## 2022-01-27 DIAGNOSIS — M51.36 DDD (DEGENERATIVE DISC DISEASE), LUMBAR: ICD-10-CM

## 2022-01-27 PROCEDURE — 99212 OFFICE O/P EST SF 10 MIN: CPT | Performed by: PAIN MEDICINE

## 2022-01-27 RX ORDER — METHYLPREDNISOLONE 4 MG/1
TABLET ORAL
Qty: 1 KIT | Refills: 0 | Status: SHIPPED | OUTPATIENT
Start: 2022-01-27 | End: 2022-02-02

## 2022-01-27 NOTE — PROGRESS NOTES
Lana Gage  (1975)    2022    TELEHEALTH EVALUATION -- Audio Only (During TLQAD-25 public health emergency)    Due to Diane 19 outbreak, patient's office visit was converted to a virtual visit. Patient was contacted and agreed to proceed with a audio only telehealth service. Patient understands that this encounter is a billable visit and that insurance coverage and co-pays are up to their individual insurance plans. At the beginning of this telehealth encounter, I verified with the patient their name and date of birth. Verbal consent for telehealth encounter was obtained. Subjective:       Chief Complaint   Patient presents with    Back Pain    Shoulder Pain    Neck Pain       Lana Gage is 51 y.o. female who complains today of:   Patient has had a flareup of some right-sided neck pain low back pain. The radiofrequency ablation done recently helped over 50%. It helped her significantly she says. Cold weather has made things worse. Achy pain. No new weakness. Plan: We discussed options. We will send a Medrol Dosepak to calm down her pain and inflammation. He does not need any injections at this point time. I did review her MRI report of her cervical spine again. She understands plan is in agreement.       Allergies:  Red dye, Flagyl [metronidazole], and Vitamin b12    History:  Past Medical History:   Diagnosis Date    Abnormal EKG 2019    Anemia     Chronic back pain     Family history of coronary artery disease 2019    History of tobacco abuse 2019    Hypotension     Morbid obesity ([facility] 2019    Osteoarthritis      Past Surgical History:   Procedure Laterality Date     SECTION      CYST REMOVAL  2015    FACE LEFT SIDE    HYSTERECTOMY, TOTAL ABDOMINAL      RAAD/ one ovary remains     Family History   Adopted: Yes     Social History     Socioeconomic History    Marital status:      Spouse name: Not on file    Number of children: Not on file    Years of education: Not on file    Highest education level: Not on file   Occupational History    Not on file   Tobacco Use    Smoking status: Former Smoker     Packs/day: 0.50     Years: 14.00     Pack years: 7.00     Types: Cigarettes     Quit date: 10/2018     Years since quitting: 3.3    Smokeless tobacco: Current User     Types: Snuff, Chew   Vaping Use    Vaping Use: Never used   Substance and Sexual Activity    Alcohol use: Yes     Alcohol/week: 0.0 standard drinks     Comment: RARELY    Drug use: No    Sexual activity: Not Currently   Other Topics Concern    Not on file   Social History Narrative    Not on file     Social Determinants of Health     Financial Resource Strain:     Difficulty of Paying Living Expenses: Not on file   Food Insecurity:     Worried About Running Out of Food in the Last Year: Not on file    Howie of Food in the Last Year: Not on file   Transportation Needs:     Lack of Transportation (Medical): Not on file    Lack of Transportation (Non-Medical):  Not on file   Physical Activity:     Days of Exercise per Week: Not on file    Minutes of Exercise per Session: Not on file   Stress:     Feeling of Stress : Not on file   Social Connections:     Frequency of Communication with Friends and Family: Not on file    Frequency of Social Gatherings with Friends and Family: Not on file    Attends Adventist Services: Not on file    Active Member of [address] [place] [address] or Organizations: Not on file    Attends Club or Organization Meetings: Not on file    Marital Status: Not on file   Intimate Partner Violence:     Fear of Current or Ex-Partner: Not on file    Emotionally Abused: Not on file    Physically Abused: Not on file    Sexually Abused: Not on file   Housing Stability:     Unable to Pay for Housing in the Last Year: Not on file    Number of [place] in the Last Year: Not on file    Unstable Housing in the Last Year: Not on file       Current Outpatient Medications on File Prior to Visit   Medication Sig Dispense Refill    furosemide (LASIX) 20 MG tablet TK 1 T PO Q 12 H PRF SWELLING OF FEET OR LEGS  2    cephALEXin (KEFLEX) 500 MG capsule TK ONE C PO QID  0    sulfamethoxazole-trimethoprim (BACTRIM DS;SEPTRA DS) 800-160 MG per tablet TK 1 T PO BID  0    MULTIPLE VITAMIN PO Take by mouth      famotidine (PEPCID) 20 MG tablet Take 1 tablet by mouth 2 times daily 20 tablet 0    loratadine (CLARITIN) 10 MG tablet Take 1 tablet by mouth daily 30 tablet 0    oxyCODONE-acetaminophen (PERCOCET) 7.5-325 MG per tablet Take 1 tablet by mouth 2 times daily as needed for Pain . Earliest Fill Date: 12/8/17 60 tablet 0    tiZANidine (ZANAFLEX) 4 MG tablet TAKE 1 TABLET BY MOUTH TWICE DAILY AS NEEDED FOR SPASMS 60 tablet 0    fentaNYL (DURAGESIC) 12 MCG/HR Place 1 patch onto the skin every 72 hours . Earliest Fill Date: 3/30/17 10 patch 0    fentaNYL (DURAGESIC) 25 MCG/HR Place 1 patch onto the skin every 72 hours  For pain. 10 patch 0     Current Facility-Administered Medications on File Prior to Visit   Medication Dose Route Frequency Provider Last Rate Last Admin    iopamidol (ISOVUE-370) 76 % injection 1 mL  1 mL Other ONCE PRN Ranjit Moseley DO        sodium chloride bacteriostatic 0.9 % injection 1 mL  1 mL Injection Once Ranjit Moseley,              HPI    Review of Systems    Objective:     Vitals: There were no vitals taken for this visit. PHYSICAL EXAMINATION:    [x] Alert  [x] Oriented to person/place/time  [x] No apparent distress      [x] Mood and affect normal  [x] Recent and remote memory intact  [x] Judgement and insight normal     [] OTHER:      Due to this being a TeleHealth encounter, evaluation of the following organ systems is limited: Vitals/Constitutional/EENT/Resp/CV/GI//MS/Neuro/Skin/Heme-Lymph-Imm. Assessment:      Diagnosis Orders   1. Lumbosacral spondylosis without myelopathy     2. DDD (degenerative disc disease), lumbar     3. Chronic pain syndrome         Plan:          Orders Placed This Encounter   Medications    methylPREDNISolone (MEDROL DOSEPACK) 4 MG tablet     Sig: Take by mouth. Dispense:  1 kit     Refill:  0       No orders of the defined types were placed in this encounter. Follow up:  No follow-ups on file. Gilma Atkins DO      >50% of minute appointment was spent with discussion, addressing questions and concerns, including prognosis, treatment options, patient education, and recommendations. [id number]    Pursuant to the emergency declaration under the [address] [place] [address], [facility] waiver authority and the [employer] Act, this Virtual  Visit was conducted, with patient's consent, to reduce the patient's risk of exposure to COVID-19 and provide continuity of care for an established patient. Services were provided through an audio discussion to substitute for in-person clinic visit.

## 2022-02-04 ENCOUNTER — TELEPHONE (OUTPATIENT)
Dept: PAIN MANAGEMENT | Age: 47
End: 2022-02-04

## 2022-02-04 NOTE — TELEPHONE ENCOUNTER
Patient's steroid pack ended and was instructed to update Dr. Meier Sender on how she is doing. Patient states her pain was relieved signifcantly when she was on the pack but two days after it ended her pain returned to how it was before. Pain is more significant on the right side. Patient asks Dr. Renea Merinoer how she should proceed.

## 2022-02-08 ENCOUNTER — PROCEDURE VISIT (OUTPATIENT)
Dept: PAIN MANAGEMENT | Age: 47
End: 2022-02-08
Payer: MEDICARE

## 2022-02-08 DIAGNOSIS — M46.1 SACROILIITIS, NOT ELSEWHERE CLASSIFIED (HCC): Primary | ICD-10-CM

## 2022-02-08 PROCEDURE — 27096 INJECT SACROILIAC JOINT: CPT | Performed by: PAIN MEDICINE

## 2022-02-08 RX ORDER — LIDOCAINE HYDROCHLORIDE 10 MG/ML
10 INJECTION, SOLUTION EPIDURAL; INFILTRATION; INTRACAUDAL; PERINEURAL ONCE
Status: COMPLETED | OUTPATIENT
Start: 2022-02-08 | End: 2022-02-08

## 2022-02-08 RX ORDER — BETAMETHASONE SODIUM PHOSPHATE AND BETAMETHASONE ACETATE 3; 3 MG/ML; MG/ML
6 INJECTION, SUSPENSION INTRA-ARTICULAR; INTRALESIONAL; INTRAMUSCULAR; SOFT TISSUE ONCE
Status: COMPLETED | OUTPATIENT
Start: 2022-02-08 | End: 2022-02-08

## 2022-02-08 RX ADMIN — BETAMETHASONE SODIUM PHOSPHATE AND BETAMETHASONE ACETATE 6 MG: 3; 3 INJECTION, SUSPENSION INTRA-ARTICULAR; INTRALESIONAL; INTRAMUSCULAR; SOFT TISSUE at 11:06

## 2022-02-08 RX ADMIN — LIDOCAINE HYDROCHLORIDE 10 MG: 10 INJECTION, SOLUTION EPIDURAL; INFILTRATION; INTRACAUDAL; PERINEURAL at 11:05

## 2022-02-08 NOTE — PROGRESS NOTES
[facility]) Physicians  Neurosurgery and Pain [address] [facility] [place] 82: [phone]  F: [phone]      Patient Name: So Suarez  : 1975     Date:  2022      Physician: Owen James DO        So Suarez is here today for interventional pain management. Preoperatively, the patient presents with SI joint mediated pain, as per history and exam. Patient has failed NSAIDs, PT, and conservative treatment. Patient has significant psychological and functional impairment due to this condition. Standard ASIPP guidelines were followed and sterile technique used. Area was cleaned with Betadine x3. Informed consent was obtained. Fluoroscopic guidance was used for this procedure. S.I. JOINT:  Right  Appropriate length spinal needle was advanced to the S.I. Joint. Negative aspiration was achieved. In total, approximately 6mg of Betamethasone and 2ml of 1% preservative free Lidocaine was injected without difficulty. Patient tolerated the procedure well, no obvious complications occurred during the procedure. Patient was appropriately monitored and discharged home in stable condition with their usual motor strength. Post Op Instructions were given to patient. Relevant and recent imaging reviewed with patient today.                                       Owen James DO

## 2022-02-21 ENCOUNTER — HOSPITAL ENCOUNTER (OUTPATIENT)
Dept: LAB | Age: 47
Discharge: HOME | End: 2022-02-21
Payer: MEDICARE

## 2022-02-21 LAB
ALBUMIN SERPL-MCNC: 4.4 G/DL (ref 3.5–4.6)
ALP BLD-CCNC: 100 U/L (ref 40–130)
ALT SERPL-CCNC: 17 U/L (ref 0–33)
ANION GAP SERPL CALCULATED.3IONS-SCNC: 17 MEQ/L (ref 9–15)
AST SERPL-CCNC: 14 U/L (ref 0–35)
BILIRUB SERPL-MCNC: <0.2 MG/DL (ref 0.2–0.7)
BUN BLDV-MCNC: 24 MG/DL (ref 6–20)
CALCIUM SERPL-MCNC: 9.1 MG/DL (ref 8.5–9.9)
CHLORIDE BLD-SCNC: 102 MEQ/L (ref 95–107)
CO2: 23 MEQ/L (ref 20–31)
CREAT SERPL-MCNC: 0.87 MG/DL (ref 0.5–0.9)
GFR AFRICAN AMERICAN: >60
GFR NON-AFRICAN AMERICAN: >60
GLOBULIN: 3.3 G/DL (ref 2.3–3.5)
GLUCOSE BLD-MCNC: 57 MG/DL (ref 70–99)
HCT VFR BLD CALC: 38.1 % (ref 37–47)
HEMOGLOBIN: 12.2 G/DL (ref 12–16)
MCH RBC QN AUTO: 28.2 PG (ref 27–31.3)
MCHC RBC AUTO-ENTMCNC: 31.9 % (ref 33–37)
MCV RBC AUTO: 88.3 FL (ref 82–100)
PDW BLD-RTO: 15.3 % (ref 11.5–14.5)
PLATELET # BLD: 351 K/UL (ref 130–400)
POTASSIUM SERPL-SCNC: 3.4 MEQ/L (ref 3.4–4.9)
PRO-BNP: 22 PG/ML
RBC # BLD: 4.31 M/UL (ref 4.2–5.4)
SODIUM BLD-SCNC: 142 MEQ/L (ref 135–144)
TOTAL PROTEIN: 7.7 G/DL (ref 6.3–8)
TSH SERPL DL<=0.05 MIU/L-ACNC: 0.83 UIU/ML (ref 0.44–3.86)
WBC # BLD: 12 K/UL (ref 4.8–10.8)

## 2022-02-21 PROCEDURE — 82306 VITAMIN D 25 HYDROXY: CPT

## 2022-02-21 PROCEDURE — 85027 COMPLETE CBC AUTOMATED: CPT

## 2022-02-21 PROCEDURE — 80053 COMPREHEN METABOLIC PANEL: CPT

## 2022-02-21 PROCEDURE — 83880 ASSAY OF NATRIURETIC PEPTIDE: CPT

## 2022-02-21 PROCEDURE — 83001 ASSAY OF GONADOTROPIN (FSH): CPT

## 2022-02-21 PROCEDURE — 83002 ASSAY OF GONADOTROPIN (LH): CPT

## 2022-02-21 PROCEDURE — 82670 ASSAY OF TOTAL ESTRADIOL: CPT

## 2022-02-21 PROCEDURE — 86038 ANTINUCLEAR ANTIBODIES: CPT

## 2022-02-21 PROCEDURE — 84443 ASSAY THYROID STIM HORMONE: CPT

## 2022-02-22 ENCOUNTER — TELEPHONE (OUTPATIENT)
Dept: PAIN MANAGEMENT | Age: 47
End: 2022-02-22

## 2022-02-22 DIAGNOSIS — M54.16 LUMBAR RADICULOPATHY: Primary | ICD-10-CM

## 2022-02-22 LAB
FOLLICLE STIMULATING HORMONE: 55.2 U/L (ref 1.7–21.5)
LH: 40.7 U/L (ref 1–95.6)
VITAMIN D 25-HYDROXY: 10.2 NG/ML (ref 30–100)

## 2022-02-22 RX ORDER — METHYLPREDNISOLONE 4 MG/1
TABLET ORAL
Qty: 1 KIT | Refills: 0 | Status: SHIPPED | OUTPATIENT
Start: 2022-02-22 | End: 2022-02-28

## 2022-02-22 NOTE — TELEPHONE ENCOUNTER
Patient called stating that she started having pain in her left side. She says the right side has also started flaring up. She says it goes from her back and shoots down, says it feels like a ball of fire. She cannot walk. She has tried motrin. She is asking for Dr. Kelly Machuca advice.

## 2022-02-23 LAB — ESTRADIOL LEVEL: <5 PG/ML (ref 27–314)

## 2022-02-24 LAB — ANA IGG, ELISA: NORMAL

## 2022-03-10 ENCOUNTER — HOSPITAL ENCOUNTER (OUTPATIENT)
Dept: MRI IMAGING | Age: 47
Discharge: HOME | End: 2022-03-12
Payer: MEDICARE

## 2022-03-10 DIAGNOSIS — M54.16 LUMBAR RADICULOPATHY: ICD-10-CM

## 2022-03-10 PROCEDURE — 72148 MRI LUMBAR SPINE W/O DYE: CPT

## 2022-03-15 ENCOUNTER — TELEPHONE (OUTPATIENT)
Dept: PAIN MANAGEMENT | Age: 47
End: 2022-03-15

## 2022-03-15 NOTE — TELEPHONE ENCOUNTER
Patient called asking about her MRI results and if Dr Nicolas Foss had reviewed them? She says that she is still having a lot of pain and is not able to do much right now.

## 2022-03-17 DIAGNOSIS — M16.11 PRIMARY OSTEOARTHRITIS OF RIGHT HIP: Primary | ICD-10-CM

## 2022-03-29 ENCOUNTER — HOSPITAL ENCOUNTER (OUTPATIENT)
Dept: MRI IMAGING | Age: 47
Discharge: HOME | End: 2022-03-31
Payer: MEDICARE

## 2022-03-29 DIAGNOSIS — M16.11 PRIMARY OSTEOARTHRITIS OF RIGHT HIP: ICD-10-CM

## 2022-03-29 PROCEDURE — 73721 MRI JNT OF LWR EXTRE W/O DYE: CPT

## 2022-04-11 ENCOUNTER — TELEPHONE (OUTPATIENT)
Dept: PAIN MANAGEMENT | Age: 47
End: 2022-04-11

## 2022-04-11 NOTE — TELEPHONE ENCOUNTER
Patient is asking about her MRI results of the right hip. She also asks that I tell Dr Heidi Way that she is in significant pain, her back is inflamed and hot to the touch. Her movement is \"slim to none\". Patient only wishes to see Dr Heidi Way and not a NP. She is scheduled to see Dr Heidi Way 4/21/22. Will Dr Heidi Way please advise?

## 2022-04-12 RX ORDER — METHYLPREDNISOLONE 4 MG/1
TABLET ORAL
Qty: 1 KIT | Refills: 0 | Status: SHIPPED | OUTPATIENT
Start: 2022-04-12 | End: 2022-04-18

## 2022-04-12 NOTE — TELEPHONE ENCOUNTER
Patient is asking if she can be prescribed a steroid pack until she is able to get in to see Dr. Arun Borrego?

## 2022-04-21 ENCOUNTER — OFFICE VISIT (OUTPATIENT)
Dept: PAIN MANAGEMENT | Age: 47
End: 2022-04-21
Payer: MEDICARE

## 2022-04-21 VITALS
WEIGHT: 225 LBS | DIASTOLIC BLOOD PRESSURE: 80 MMHG | HEIGHT: 66 IN | BODY MASS INDEX: 36.16 KG/M2 | TEMPERATURE: 97.3 F | SYSTOLIC BLOOD PRESSURE: 132 MMHG

## 2022-04-21 DIAGNOSIS — M47.817 LUMBOSACRAL SPONDYLOSIS WITHOUT MYELOPATHY: ICD-10-CM

## 2022-04-21 DIAGNOSIS — M51.36 DDD (DEGENERATIVE DISC DISEASE), LUMBAR: ICD-10-CM

## 2022-04-21 DIAGNOSIS — M46.1 SACROILIITIS, NOT ELSEWHERE CLASSIFIED (HCC): Primary | ICD-10-CM

## 2022-04-21 DIAGNOSIS — M16.11 PRIMARY OSTEOARTHRITIS OF RIGHT HIP: ICD-10-CM

## 2022-04-21 PROCEDURE — 99214 OFFICE O/P EST MOD 30 MIN: CPT | Performed by: PAIN MEDICINE

## 2022-04-21 PROCEDURE — G8417 CALC BMI ABV UP PARAM F/U: HCPCS | Performed by: PAIN MEDICINE

## 2022-04-21 PROCEDURE — 4004F PT TOBACCO SCREEN RCVD TLK: CPT | Performed by: PAIN MEDICINE

## 2022-04-21 PROCEDURE — G8427 DOCREV CUR MEDS BY ELIG CLIN: HCPCS | Performed by: PAIN MEDICINE

## 2022-04-21 ASSESSMENT — ENCOUNTER SYMPTOMS
NAUSEA: 0
BACK PAIN: 0
SHORTNESS OF BREATH: 0
DIARRHEA: 0
CONSTIPATION: 0

## 2022-04-21 NOTE — PROGRESS NOTES
[facility] ([facility]) Physicians  Neurosurgery and Pain [facility]  [address] [facility] [address] [facility] , [address] [facility], [facility]: [phone]  F: [phone]        Shamar Luke  ()    2022    Subjective:     Shamar Luke is 49 y.o. female who complains today of:     Chief Complaint   Patient presents with    Back Pain    Hip Pain     right      Patient here today for follow-up. She is having a lot of right hip pain and groin pain with walking. She recently had MRI lumbar spine and right hip. The right hip MRI shows significant arthritis in 1 particular region. History of RF ablation. MRI lumbar spine show significant arthritis especially at L4-5. She has tried anti-inflammatories. Sitting is better. The pain is quite significant for her. Plan: We discussed options. We will have her see Dr. Kt Yeh for a right hip injection under ultrasound guidance. We will also have her see orthopedic surgery to evaluate the right hip for any possible surgical interventions. He understands plan is in agreement.     Allergies:  Red dye, Flagyl [metronidazole], and Vitamin b12    Past Medical History:   Diagnosis Date    Abnormal EKG 2019    Anemia     Chronic back pain     Family history of coronary artery disease 2019    History of tobacco abuse 2019    Hypotension     Morbid obesity ([address] 2019    Osteoarthritis      Past Surgical History:   Procedure Laterality Date     SECTION      CYST REMOVAL  2015    FACE LEFT SIDE    HYSTERECTOMY, TOTAL ABDOMINAL      RAAD/ one ovary remains     Family History   Adopted: Yes     Social History     Socioeconomic History    Marital status:      Spouse name: Not on file    Number of children: Not on file    Years of education: Not on file    Highest education level: Not on file   Occupational History    Not on file   Tobacco Use    Smoking status: Former Smoker     Packs/day: 0.50     Years: 14.00     Pack years: 7.00     Types: Cigarettes     Quit date: 10/2018     Years since quitting: 3.5    Smokeless tobacco: Current User     Types: Snuff, Chew   Vaping Use    Vaping Use: Never used   Substance and Sexual Activity    Alcohol use: Yes     Alcohol/week: 0.0 standard drinks     Comment: RARELY    Drug use: No    Sexual activity: Not Currently   Other Topics Concern    Not on file   Social History Narrative    Not on file     Social Determinants of Health     Financial Resource Strain:     Difficulty of Paying Living Expenses: Not on file   Food Insecurity:     Worried About Running Out of Food in the Last Year: Not on file    Howie of Food in the Last Year: Not on file   Transportation Needs:     Lack of Transportation (Medical): Not on file    Lack of Transportation (Non-Medical):  Not on file   Physical Activity:     Days of Exercise per Week: Not on file    Minutes of Exercise per Session: Not on file   Stress:     Feeling of Stress : Not on file   Social Connections:     Frequency of Communication with Friends and Family: Not on file    Frequency of Social Gatherings with Friends and Family: Not on file    Attends Jainism Services: Not on file    Active Member of [address] [place] [address] [employer] or Organizations: Not on file    Attends Club or Organization Meetings: Not on file    Marital Status: Not on file   Intimate Partner Violence:     Fear of Current or Ex-Partner: Not on file    Emotionally Abused: Not on file    Physically Abused: Not on file    Sexually Abused: Not on file   Housing Stability:     Unable to Pay for Housing in the Last Year: Not on file    Number of [place] in the Last Year: Not on file    Unstable Housing in the Last Year: Not on file       Current Outpatient Medications on File Prior to Visit   Medication Sig Dispense Refill    furosemide (LASIX) 20 MG tablet TK 1 T PO Q 12 H PRF SWELLING OF FEET OR LEGS  2    cephALEXin (KEFLEX) 500 MG capsule TK ONE C PO QID 0    sulfamethoxazole-trimethoprim (BACTRIM DS;SEPTRA DS) 800-160 MG per tablet TK 1 T PO BID  0    MULTIPLE VITAMIN PO Take by mouth      famotidine (PEPCID) 20 MG tablet Take 1 tablet by mouth 2 times daily 20 tablet 0    loratadine (CLARITIN) 10 MG tablet Take 1 tablet by mouth daily 30 tablet 0    oxyCODONE-acetaminophen (PERCOCET) 7.5-325 MG per tablet Take 1 tablet by mouth 2 times daily as needed for Pain . Earliest Fill Date: 12/8/17 60 tablet 0    tiZANidine (ZANAFLEX) 4 MG tablet TAKE 1 TABLET BY MOUTH TWICE DAILY AS NEEDED FOR SPASMS 60 tablet 0    fentaNYL (DURAGESIC) 12 MCG/HR Place 1 patch onto the skin every 72 hours . Earliest Fill Date: 3/30/17 10 patch 0    fentaNYL (DURAGESIC) 25 MCG/HR Place 1 patch onto the skin every 72 hours  For pain. 10 patch 0     Current Facility-Administered Medications on File Prior to Visit   Medication Dose Route Frequency Provider Last Rate Last Admin    iopamidol (ISOVUE-370) 76 % injection 1 mL  1 mL Other ONCE PRN Tennie , DO        sodium chloride bacteriostatic 0.9 % injection 1 mL  1 mL Injection Once Tennie , DO               HPI    Review of Systems   Constitutional: Negative for fever. HENT: Negative for hearing loss. Respiratory: Negative for shortness of breath. Gastrointestinal: Negative for constipation, diarrhea and nausea. Genitourinary: Negative for difficulty urinating. Musculoskeletal: Negative for back pain and neck pain. Skin: Negative for rash. Neurological: Negative for headaches. Hematological: Does not bruise/bleed easily. Psychiatric/Behavioral: Negative for sleep disturbance.          Objective:     Vitals:  /80   Temp 97.3 °F (36.3 °C)   Ht 5' 6\" (1.676 m)   Wt 225 lb (102.1 kg)   BMI 36.32 kg/m² Pain Score:  10 - Worst pain ever      Physical Exam  Patient alert and oriented times three, recent and remote memory intact, mood and affect normal, judgement and insight normal. Strength functional for ambulation. Balance and coordinational functional. Visualized skin intact. No visualized cyanosis, pulses intact. Cranial nerves 2-12 grossly intact. No obvious upper motor neuron signs seen. Sensation intact to light touch. Antalgic right lower extremity gait. Pain with hip internal or external rotation on the right. Assessment:      Diagnosis Orders   1. Sacroiliitis, not elsewhere classified ([address]     2. DDD (degenerative disc disease), lumbar     3. Lumbosacral spondylosis without myelopathy     4.  Primary osteoarthritis of right hip  Amb External Referral To Orthopedic Surgery       Plan:

## 2022-04-22 ENCOUNTER — INITIAL CONSULT (OUTPATIENT)
Dept: SPORTS MEDICINE | Age: 47
End: 2022-04-22
Payer: MEDICARE

## 2022-04-22 VITALS
SYSTOLIC BLOOD PRESSURE: 106 MMHG | WEIGHT: 225 LBS | DIASTOLIC BLOOD PRESSURE: 68 MMHG | HEIGHT: 67 IN | BODY MASS INDEX: 35.31 KG/M2 | TEMPERATURE: 98.6 F

## 2022-04-22 DIAGNOSIS — G89.4 CHRONIC PAIN SYNDROME: ICD-10-CM

## 2022-04-22 DIAGNOSIS — M47.817 LUMBOSACRAL SPONDYLOSIS WITHOUT MYELOPATHY: ICD-10-CM

## 2022-04-22 DIAGNOSIS — M16.11 PRIMARY OSTEOARTHRITIS OF RIGHT HIP: ICD-10-CM

## 2022-04-22 DIAGNOSIS — M87.9 OSTEONECROSIS OF RIGHT HIP (HCC): Primary | ICD-10-CM

## 2022-04-22 PROCEDURE — 4004F PT TOBACCO SCREEN RCVD TLK: CPT | Performed by: FAMILY MEDICINE

## 2022-04-22 PROCEDURE — G8427 DOCREV CUR MEDS BY ELIG CLIN: HCPCS | Performed by: FAMILY MEDICINE

## 2022-04-22 PROCEDURE — 99204 OFFICE O/P NEW MOD 45 MIN: CPT | Performed by: FAMILY MEDICINE

## 2022-04-22 PROCEDURE — G8417 CALC BMI ABV UP PARAM F/U: HCPCS | Performed by: FAMILY MEDICINE

## 2022-04-22 ASSESSMENT — ENCOUNTER SYMPTOMS
DIARRHEA: 0
BACK PAIN: 0
NAUSEA: 0
CONSTIPATION: 0
SHORTNESS OF BREATH: 0

## 2022-04-22 NOTE — PROGRESS NOTES
[facility]) Physicians  Neurosurgery and Pain [facility]  [address] [facility], [facility] [address] [facility] , [address] [place], [place]  P: [phone]  F: [phone]      Tamia Eugene  ([id number])    2022    Subjective:     Tamia Eugene is 49 y.o. female who complains today of:    Chief Complaint   Patient presents with    Hip Pain     RIGHT HIP       HPI     Patient comes in complaining of approximately 6 weeks of right hip pain she states that she been seeing Dr. Dean Givens in the past for her back issues and they are thinking that some of it was due to that however recently they got an MRI that shows some issues in the hip and sent today for further evaluation and treatment  Allergies:  Red dye, Flagyl [metronidazole], and Vitamin b12    Past Medical History:   Diagnosis Date    Abnormal EKG 2019    Anemia     Chronic back pain     Family history of coronary artery disease 2019    History of tobacco abuse 2019    Hypotension     Morbid obesity ([address] 2019    Osteoarthritis      Past Surgical History:   Procedure Laterality Date     SECTION      CYST REMOVAL  2015    FACE LEFT SIDE    HYSTERECTOMY, TOTAL ABDOMINAL      RAAD/ one ovary remains     Family History   Adopted: Yes     Social History     Socioeconomic History    Marital status:      Spouse name: Not on file    Number of children: Not on file    Years of education: Not on file    Highest education level: Not on file   Occupational History    Not on file   Tobacco Use    Smoking status: Former Smoker     Packs/day: 0.50     Years: 14.00     Pack years: 7.00     Types: Cigarettes     Quit date: 10/2018     Years since quitting: 3.5    Smokeless tobacco: Current User     Types: Snuff, Chew   Vaping Use    Vaping Use: Never used   Substance and Sexual Activity    Alcohol use:  Yes     Alcohol/week: 0.0 standard drinks     Comment: RARELY    Drug use: No    Sexual activity: Not Currently Other Topics Concern    Not on file   Social History Narrative    Not on file     Social Determinants of Health     Financial Resource Strain:     Difficulty of Paying Living Expenses: Not on file   Food Insecurity:     Worried About Running Out of Food in the Last Year: Not on file    Howie of Food in the Last Year: Not on file   Transportation Needs:     Lack of Transportation (Medical): Not on file    Lack of Transportation (Non-Medical):  Not on file   Physical Activity:     Days of Exercise per Week: Not on file    Minutes of Exercise per Session: Not on file   Stress:     Feeling of Stress : Not on file   Social Connections:     Frequency of Communication with Friends and Family: Not on file    Frequency of Social Gatherings with Friends and Family: Not on file    Attends [employer] Services: Not on file    Active Member of [address] [place] [address] or Organizations: Not on file    Attends Club or Organization Meetings: Not on file    Marital Status: Not on file   Intimate Partner Violence:     Fear of Current or Ex-Partner: Not on file    Emotionally Abused: Not on file    Physically Abused: Not on file    Sexually Abused: Not on file   Housing Stability:     Unable to Pay for Housing in the Last Year: Not on file    Number of [place] in the Last Year: Not on file    Unstable Housing in the Last Year: Not on file       Current Outpatient Medications on File Prior to Visit   Medication Sig Dispense Refill    furosemide (LASIX) 20 MG tablet TK 1 T PO Q 12 H PRF SWELLING OF FEET OR LEGS  2    cephALEXin (KEFLEX) 500 MG capsule TK ONE C PO QID  0    sulfamethoxazole-trimethoprim (BACTRIM DS;SEPTRA DS) 800-160 MG per tablet TK 1 T PO BID  0    MULTIPLE VITAMIN PO Take by mouth      famotidine (PEPCID) 20 MG tablet Take 1 tablet by mouth 2 times daily 20 tablet 0    loratadine (CLARITIN) 10 MG tablet Take 1 tablet by mouth daily 30 tablet 0    tiZANidine (ZANAFLEX) 4 MG tablet TAKE 1 TABLET BY MOUTH TWICE DAILY AS NEEDED FOR SPASMS 60 tablet 0    oxyCODONE-acetaminophen (PERCOCET) 7.5-325 MG per tablet Take 1 tablet by mouth 2 times daily as needed for Pain . Earliest Fill Date: 12/8/17 60 tablet 0    fentaNYL (DURAGESIC) 12 MCG/HR Place 1 patch onto the skin every 72 hours . Earliest Fill Date: 3/30/17 10 patch 0    fentaNYL (DURAGESIC) 25 MCG/HR Place 1 patch onto the skin every 72 hours  For pain. 10 patch 0     Current Facility-Administered Medications on File Prior to Visit   Medication Dose Route Frequency Provider Last Rate Last Admin    iopamidol (ISOVUE-370) 76 % injection 1 mL  1 mL Other ONCE PRN Rice Fritter, DO        sodium chloride bacteriostatic 0.9 % injection 1 mL  1 mL Injection Once Rice Fritter, DO             Review of Systems   Constitutional: Negative for fever. HENT: Negative for hearing loss. Respiratory: Negative for shortness of breath. Gastrointestinal: Negative for constipation, diarrhea and nausea. Genitourinary: Negative for difficulty urinating. Musculoskeletal: Negative for back pain and neck pain. Skin: Negative for rash. Neurological: Negative for headaches. Hematological: Does not bruise/bleed easily. Psychiatric/Behavioral: Negative for sleep disturbance. Objective:     Vitals:  /68 (Site: Left Upper Arm)   Temp 98.6 °F (37 °C)   Ht 5' 7\" (1.702 m)   Wt 225 lb (102.1 kg)   BMI 35.24 kg/m² Pain Score:  10 - Worst pain ever      Physical Exam  Constitutional:       Appearance: Normal appearance. HENT:      Head: Normocephalic. Nose: No rhinorrhea. Mouth/Throat:      Pharynx: Oropharynx is clear. Eyes:      Pupils: Pupils are equal, round, and reactive to light. Cardiovascular:      Rate and Rhythm: Normal rate. Pulses: Normal pulses. Pulmonary:      Breath sounds: No wheezing. Abdominal:      Palpations: Abdomen is soft. Musculoskeletal:         General: No deformity. Cervical back: No rigidity.

## 2022-04-25 RX ORDER — OXYCODONE AND ACETAMINOPHEN 7.5; 325 MG/1; MG/1
1 TABLET ORAL 2 TIMES DAILY PRN
Qty: 60 TABLET | Refills: 0 | Status: SHIPPED | OUTPATIENT
Start: 2022-04-25 | End: 2022-05-05 | Stop reason: SDUPTHER

## 2022-04-26 ENCOUNTER — HOSPITAL ENCOUNTER (OUTPATIENT)
Dept: CT IMAGING | Age: 47
Discharge: HOME | End: 2022-04-28
Payer: MEDICARE

## 2022-04-26 DIAGNOSIS — M87.9 OSTEONECROSIS OF RIGHT HIP (HCC): ICD-10-CM

## 2022-04-26 DIAGNOSIS — M16.11 PRIMARY OSTEOARTHRITIS OF RIGHT HIP: ICD-10-CM

## 2022-04-26 PROCEDURE — 73700 CT LOWER EXTREMITY W/O DYE: CPT

## 2022-04-27 ENCOUNTER — TELEPHONE (OUTPATIENT)
Dept: PAIN MANAGEMENT | Age: 47
End: 2022-04-27

## 2022-04-27 NOTE — TELEPHONE ENCOUNTER
Tried to submit prior authorization request online ([employer]) for Oxycodone (Percocet) 7.5-325mg tablet 3 times. Submitted to Ayad Nayak with same response from both: The request to perform a coverage review for the above listed medication has been terminated for the following reason: this member has Medicare drug coverage as the primary insurer. Please have your staff forward a prior authorization request to the proper insurance carrier with the corresponding Medicare ID number. [email or web address] site suggested sending request to [facility]. That response was patient cannot be located. We will wait for fax from pharmacy so we can submit the request to the plan provided on the notification.

## 2022-04-27 NOTE — TELEPHONE ENCOUNTER
Received fax from [facility] on [address] stating the following:  Patient's insurance is limiting them to a 7-day supply of this medication. They will need a new prescription to continue therapy after 7 days. A prior authorization will need to be done for patient to receive anything greater than a 7-day supply. I discussed this with Nida Ramirez. Since the patient has not been prescribed Oxycodone (Percocet) for the past few years, she will have to fill a 7 day supply and call us back to let us know how well the medication helped her. Another prescription can be written after a 7 day trial.      I called and explained this to the patient. She has Medicare primary and [facility] secondary for her prescriptions.

## 2022-04-27 NOTE — TELEPHONE ENCOUNTER
Pt called in tears over the pain in her hip. Pt is not sure what to do as she awaits to see Dr. Keri Emmanuel as the pain is unbareable. Patient was informed that Dr. Anil Jane sent in a prescription of Percocet and will pick that up today to try.

## 2022-05-04 DIAGNOSIS — M47.817 LUMBOSACRAL SPONDYLOSIS WITHOUT MYELOPATHY: ICD-10-CM

## 2022-05-04 DIAGNOSIS — G89.4 CHRONIC PAIN SYNDROME: ICD-10-CM

## 2022-05-04 NOTE — TELEPHONE ENCOUNTER
Refill sent in on 4/25/22 could only be filled for 7 days due to insurance. Will Dr. Freddie Alonso send in the remaining prescription? Also patient wanted to inform Dr. Freddie Alonso that she saw Dr. Nadine Davison and was sent to [address] [place] [address] to receive a procedure to determine if her pain is from her hip or her back. Patient states her next appt is on 5/25/22 with Dr. Dimitrios Collado and she wishes to hold off on any procedures from Dr. Freddie Alonso until she sees him again.

## 2022-05-05 RX ORDER — OXYCODONE AND ACETAMINOPHEN 7.5; 325 MG/1; MG/1
1 TABLET ORAL 2 TIMES DAILY PRN
Qty: 60 TABLET | Refills: 0 | Status: SHIPPED | OUTPATIENT
Start: 2022-05-05 | End: 2022-06-03 | Stop reason: SDUPTHER

## 2022-05-23 ENCOUNTER — HOSPITAL ENCOUNTER (OUTPATIENT)
Dept: PHYSICAL THERAPY | Age: 47
Setting detail: THERAPIES SERIES
Discharge: HOME | End: 2022-05-23

## 2022-05-25 ENCOUNTER — TELEPHONE (OUTPATIENT)
Dept: PAIN MANAGEMENT | Age: 47
End: 2022-05-25

## 2022-05-25 NOTE — TELEPHONE ENCOUNTER
Patient states she had follow up with Dr. Marie Mauricio after dye injection in her hip and was told that she will need surgery in the future but not right now. Pt states asks if she can schedule her bilateral lumbar RFA now?

## 2022-06-03 DIAGNOSIS — M47.817 LUMBOSACRAL SPONDYLOSIS WITHOUT MYELOPATHY: ICD-10-CM

## 2022-06-03 DIAGNOSIS — G89.4 CHRONIC PAIN SYNDROME: ICD-10-CM

## 2022-06-03 RX ORDER — OXYCODONE AND ACETAMINOPHEN 7.5; 325 MG/1; MG/1
1 TABLET ORAL 2 TIMES DAILY PRN
Qty: 18 TABLET | Refills: 0 | Status: SHIPPED | OUTPATIENT
Start: 2022-06-04 | End: 2022-06-13 | Stop reason: SDUPTHER

## 2022-06-03 NOTE — TELEPHONE ENCOUNTER
Requested Prescriptions     Pending Prescriptions Disp Refills    oxyCODONE-acetaminophen (PERCOCET) 7.5-325 MG per tablet 18 tablet 0     Sig: Take 1 tablet by mouth 2 times daily as needed for Pain for up to 9 days. Patient last seen on: 4/21/22  Date of last surgery:  n/a  Date of last refill:  5/5/22  Pain level:  n/a  Patient complaining of:  Pt next appt is after rx is due.    Future appts: 6/13/22

## 2022-06-13 ENCOUNTER — OFFICE VISIT (OUTPATIENT)
Dept: PAIN MANAGEMENT | Age: 47
End: 2022-06-13
Payer: MEDICARE

## 2022-06-13 DIAGNOSIS — G89.4 CHRONIC PAIN SYNDROME: ICD-10-CM

## 2022-06-13 DIAGNOSIS — M47.817 LUMBOSACRAL SPONDYLOSIS WITHOUT MYELOPATHY: Primary | ICD-10-CM

## 2022-06-13 PROCEDURE — 64635 DESTROY LUMB/SAC FACET JNT: CPT | Performed by: PAIN MEDICINE

## 2022-06-13 PROCEDURE — 64636 DESTROY L/S FACET JNT ADDL: CPT | Performed by: PAIN MEDICINE

## 2022-06-13 RX ORDER — BETAMETHASONE SODIUM PHOSPHATE AND BETAMETHASONE ACETATE 3; 3 MG/ML; MG/ML
6 INJECTION, SUSPENSION INTRA-ARTICULAR; INTRALESIONAL; INTRAMUSCULAR; SOFT TISSUE ONCE
Status: COMPLETED | OUTPATIENT
Start: 2022-06-13 | End: 2022-06-13

## 2022-06-13 RX ORDER — OXYCODONE AND ACETAMINOPHEN 7.5; 325 MG/1; MG/1
1 TABLET ORAL 2 TIMES DAILY PRN
Qty: 60 TABLET | Refills: 0 | Status: SHIPPED | OUTPATIENT
Start: 2022-06-13 | End: 2022-07-11 | Stop reason: SDUPTHER

## 2022-06-13 RX ORDER — LIDOCAINE HYDROCHLORIDE 10 MG/ML
10 INJECTION, SOLUTION EPIDURAL; INFILTRATION; INTRACAUDAL; PERINEURAL ONCE
Status: COMPLETED | OUTPATIENT
Start: 2022-06-13 | End: 2022-06-13

## 2022-06-13 RX ADMIN — LIDOCAINE HYDROCHLORIDE 10 MG: 10 INJECTION, SOLUTION EPIDURAL; INFILTRATION; INTRACAUDAL; PERINEURAL at 14:53

## 2022-06-13 RX ADMIN — BETAMETHASONE SODIUM PHOSPHATE AND BETAMETHASONE ACETATE 6 MG: 3; 3 INJECTION, SUSPENSION INTRA-ARTICULAR; INTRALESIONAL; INTRAMUSCULAR; SOFT TISSUE at 14:54

## 2022-06-13 NOTE — PROGRESS NOTES
[facility]) Physicians  Neurosurgery and Pain [address] [facility]: [phone]  F: [phone]      Lumbar Radio Frequency Ablation     Provider: Bassem Jarrell DO          Patient Name: Cedric Wolff : 1975        Date: 2022      Cedric Wolff is here today for interventional pain management. Standard ASIPP guidelines were followed and sterile technique used. Area was cleaned with Betadine x3. Informed consent was obtained. Fluoroscopic guidance was used for this procedure. Multiple views of fluoroscopy were used during procedure to assist with needle placement. Appropriate sized RF 10mm active tip needle was used and advance to appropriate anatomic location. There was appropriate multifidus contraction noted with motor stimulation at 2 Hz between 0.5-1.5 volts. No limb or gluteal contraction was noted taking it up to 3.5 volts. Prior to lesioning at 80 degrees Celsius for 90 seconds, approximately 0.75mg/1mg of Celestone and ½ cc of 1% preservative free Lidocaine was injected. Impedance was between 200-500 ohms during the procedure. Patient tolerated the procedure well, no obvious complications occurred during the procedure. Patient was appropriately monitored and discharged home in stable condition with their usual motor strength. Post Op instructions were given to patient.           [x] Bilateral [] T11 [] L1 [] S1     [] T12 [] L2 [] S2    [] Right  [x] L3 [] S3      [x] L4 [] S4    [] Left  [x] L5                              Bassem Jarrell DO

## 2022-07-11 DIAGNOSIS — G89.4 CHRONIC PAIN SYNDROME: ICD-10-CM

## 2022-07-11 DIAGNOSIS — M47.817 LUMBOSACRAL SPONDYLOSIS WITHOUT MYELOPATHY: ICD-10-CM

## 2022-07-11 NOTE — TELEPHONE ENCOUNTER
Requested Prescriptions     Pending Prescriptions Disp Refills    oxyCODONE-acetaminophen (PERCOCET) 7.5-325 MG per tablet 60 tablet 0     Sig: Take 1 tablet by mouth 2 times daily as needed for Pain for up to 30 days.        Patient last seen on:  6/13/22  Date of last surgery:  n/a  Date of last refill:  6/13/22  Pain level:  n/a  Patient complaining of:  Patient asks for rx  Future appts: 9/15/22    PATIENT WILL ONLY SEE DR. Melodie Leon

## 2022-07-12 RX ORDER — OXYCODONE AND ACETAMINOPHEN 7.5; 325 MG/1; MG/1
1 TABLET ORAL 2 TIMES DAILY PRN
Qty: 60 TABLET | Refills: 0 | Status: SHIPPED | OUTPATIENT
Start: 2022-07-13 | End: 2022-08-09 | Stop reason: SDUPTHER

## 2022-08-09 DIAGNOSIS — M47.817 LUMBOSACRAL SPONDYLOSIS WITHOUT MYELOPATHY: ICD-10-CM

## 2022-08-09 DIAGNOSIS — G89.4 CHRONIC PAIN SYNDROME: ICD-10-CM

## 2022-08-09 RX ORDER — OXYCODONE AND ACETAMINOPHEN 7.5; 325 MG/1; MG/1
1 TABLET ORAL 2 TIMES DAILY PRN
Qty: 60 TABLET | Refills: 0 | Status: SHIPPED | OUTPATIENT
Start: 2022-08-12 | End: 2022-09-11 | Stop reason: SDUPTHER

## 2022-08-09 NOTE — TELEPHONE ENCOUNTER
Requested Prescriptions     Pending Prescriptions Disp Refills    oxyCODONE-acetaminophen (PERCOCET) 7.5-325 MG per tablet 60 tablet 0     Sig: Take 1 tablet by mouth 2 times daily as needed for Pain for up to 30 days.          Patient last seen on:  6/13  Date of last surgery:  n/a  Date of last refill:  7/13  Pain level:  n/a  Patient complaining of:  Patient asking for refill  Future appts: 9/15

## 2022-09-08 DIAGNOSIS — M47.817 LUMBOSACRAL SPONDYLOSIS WITHOUT MYELOPATHY: ICD-10-CM

## 2022-09-08 DIAGNOSIS — G89.4 CHRONIC PAIN SYNDROME: ICD-10-CM

## 2022-09-08 NOTE — TELEPHONE ENCOUNTER
Requested Prescriptions     Pending Prescriptions Disp Refills    oxyCODONE-acetaminophen (PERCOCET) 7.5-325 MG per tablet 10 tablet 0     Sig: Take 1 tablet by mouth 2 times daily as needed for Pain for up to 5 days.        Patient last seen on:  6/13  Date of last surgery:  n/a  Date of last refill:  8/12  Pain level:  n/a  Patient complaining of:  PT is asking for refill  Future appts: 9/15

## 2022-09-09 ENCOUNTER — HOSPITAL ENCOUNTER (OUTPATIENT)
Dept: LAB | Age: 47
Discharge: HOME | End: 2022-09-09
Payer: MEDICARE

## 2022-09-09 LAB
ALBUMIN SERPL-MCNC: 4.5 G/DL (ref 3.5–4.6)
ALP BLD-CCNC: 116 U/L (ref 40–130)
ALT SERPL-CCNC: 14 U/L (ref 0–33)
ANION GAP SERPL CALCULATED.3IONS-SCNC: 10 MEQ/L (ref 9–15)
AST SERPL-CCNC: 14 U/L (ref 0–35)
BACTERIA: ABNORMAL /HPF
BASOPHILS ABSOLUTE: 0.1 K/UL (ref 0–0.2)
BASOPHILS RELATIVE PERCENT: 1 %
BILIRUB SERPL-MCNC: 0.3 MG/DL (ref 0.2–0.7)
BILIRUBIN URINE: NEGATIVE
BLOOD, URINE: ABNORMAL
BUN BLDV-MCNC: 13 MG/DL (ref 6–20)
CALCIUM SERPL-MCNC: 9.2 MG/DL (ref 8.5–9.9)
CHLORIDE BLD-SCNC: 101 MEQ/L (ref 95–107)
CHOLESTEROL, TOTAL: 167 MG/DL (ref 0–199)
CLARITY: CLEAR
CO2: 26 MEQ/L (ref 20–31)
COLOR: YELLOW
CREAT SERPL-MCNC: 0.8 MG/DL (ref 0.5–0.9)
EOSINOPHILS ABSOLUTE: 0.1 K/UL (ref 0–0.7)
EOSINOPHILS RELATIVE PERCENT: 0.5 %
EPITHELIAL CELLS, UA: ABNORMAL /HPF (ref 0–5)
GFR AFRICAN AMERICAN: >60
GFR NON-AFRICAN AMERICAN: >60
GLOBULIN: 3.2 G/DL (ref 2.3–3.5)
GLUCOSE BLD-MCNC: 92 MG/DL (ref 70–99)
GLUCOSE URINE: NEGATIVE MG/DL
HCT VFR BLD CALC: 36.7 % (ref 37–47)
HDLC SERPL-MCNC: 59 MG/DL (ref 40–59)
HEMOGLOBIN: 11.9 G/DL (ref 12–16)
HYALINE CASTS: ABNORMAL /HPF (ref 0–5)
KETONES, URINE: NEGATIVE MG/DL
LDL CHOLESTEROL CALCULATED: 83 MG/DL (ref 0–129)
LEUKOCYTE ESTERASE, URINE: NEGATIVE
LYMPHOCYTES ABSOLUTE: 2.7 K/UL (ref 1–4.8)
LYMPHOCYTES RELATIVE PERCENT: 24 %
MCH RBC QN AUTO: 28.5 PG (ref 27–31.3)
MCHC RBC AUTO-ENTMCNC: 32.5 % (ref 33–37)
MCV RBC AUTO: 87.8 FL (ref 82–100)
MONOCYTES ABSOLUTE: 0.6 K/UL (ref 0.2–0.8)
MONOCYTES RELATIVE PERCENT: 5.5 %
NEUTROPHILS ABSOLUTE: 7.7 K/UL (ref 1.4–6.5)
NEUTROPHILS RELATIVE PERCENT: 69 %
NITRITE, URINE: NEGATIVE
PDW BLD-RTO: 14.4 % (ref 11.5–14.5)
PH UA: 5.5 (ref 5–9)
PLATELET # BLD: 381 K/UL (ref 130–400)
POTASSIUM SERPL-SCNC: 3.7 MEQ/L (ref 3.4–4.9)
PROTEIN UA: NEGATIVE MG/DL
RBC # BLD: 4.18 M/UL (ref 4.2–5.4)
RBC UA: ABNORMAL /HPF (ref 0–5)
SEDIMENTATION RATE, ERYTHROCYTE: 18 MM (ref 0–20)
SODIUM BLD-SCNC: 137 MEQ/L (ref 135–144)
SPECIFIC GRAVITY UA: 1.02 (ref 1–1.03)
TOTAL PROTEIN: 7.7 G/DL (ref 6.3–8)
TRIGL SERPL-MCNC: 124 MG/DL (ref 0–150)
TSH REFLEX: 1.31 UIU/ML (ref 0.44–3.86)
UROBILINOGEN, URINE: 0.2 E.U./DL
WBC # BLD: 11.1 K/UL (ref 4.8–10.8)
WBC UA: ABNORMAL /HPF (ref 0–5)

## 2022-09-09 PROCEDURE — 80061 LIPID PANEL: CPT

## 2022-09-09 PROCEDURE — 84443 ASSAY THYROID STIM HORMONE: CPT

## 2022-09-09 PROCEDURE — 86038 ANTINUCLEAR ANTIBODIES: CPT

## 2022-09-09 PROCEDURE — 80053 COMPREHEN METABOLIC PANEL: CPT

## 2022-09-09 PROCEDURE — 85652 RBC SED RATE AUTOMATED: CPT

## 2022-09-09 PROCEDURE — 84436 ASSAY OF TOTAL THYROXINE: CPT

## 2022-09-09 PROCEDURE — 81001 URINALYSIS AUTO W/SCOPE: CPT

## 2022-09-09 PROCEDURE — 85025 COMPLETE CBC W/AUTO DIFF WBC: CPT

## 2022-09-10 LAB — T4 TOTAL: 6.7 UG/DL (ref 4.5–10.9)

## 2022-09-11 RX ORDER — OXYCODONE AND ACETAMINOPHEN 7.5; 325 MG/1; MG/1
1 TABLET ORAL 2 TIMES DAILY PRN
Qty: 10 TABLET | Refills: 0 | Status: SHIPPED | OUTPATIENT
Start: 2022-09-11 | End: 2022-09-15 | Stop reason: SDUPTHER

## 2022-09-12 LAB — ANA IGG, ELISA: NORMAL

## 2022-09-15 ENCOUNTER — TELEMEDICINE (OUTPATIENT)
Dept: PAIN MANAGEMENT | Age: 47
End: 2022-09-15
Payer: MEDICARE

## 2022-09-15 DIAGNOSIS — M16.11 PRIMARY OSTEOARTHRITIS OF RIGHT HIP: ICD-10-CM

## 2022-09-15 DIAGNOSIS — G89.4 CHRONIC PAIN SYNDROME: Primary | ICD-10-CM

## 2022-09-15 DIAGNOSIS — M47.817 LUMBOSACRAL SPONDYLOSIS WITHOUT MYELOPATHY: ICD-10-CM

## 2022-09-15 PROCEDURE — 99441 PR PHYS/QHP TELEPHONE EVALUATION 5-10 MIN: CPT | Performed by: PAIN MEDICINE

## 2022-09-15 RX ORDER — OXYCODONE AND ACETAMINOPHEN 7.5; 325 MG/1; MG/1
1 TABLET ORAL 2 TIMES DAILY PRN
Qty: 60 TABLET | Refills: 0 | Status: SHIPPED | OUTPATIENT
Start: 2022-09-15 | End: 2022-10-10 | Stop reason: SDUPTHER

## 2022-09-15 RX ORDER — OXYCODONE AND ACETAMINOPHEN 7.5; 325 MG/1; MG/1
1 TABLET ORAL 2 TIMES DAILY PRN
Qty: 60 TABLET | Refills: 0 | Status: SHIPPED | OUTPATIENT
Start: 2022-09-15 | End: 2022-09-15

## 2022-09-15 NOTE — PROGRESS NOTES
Marisela Frazier  (1975)    9/15/2022    TELEHEALTH EVALUATION -- Audio Only (During VOB-39 public health emergency)    Due to Jennie Castleman 19 outbreak, patient's office visit was converted to a virtual visit. Patient was contacted and agreed to proceed with a audio only telehealth service. Patient understands that this encounter is a billable visit and that insurance coverage and co-pays are up to their individual insurance plans. At the beginning of this telehealth encounter, I verified with the patient their name and date of birth. Verbal consent for telehealth encounter was obtained. Subjective:       Chief Complaint   Patient presents with    Back Pain    Hip Pain       Marisela Frazier is 49 y.o. female who complains today of:     Patient had a telehealth visit due to possible COVID exposure. She has low back pain on pain her hips worse on the right. She is doing may need a right hip replacement with Dr. Fab Paul as she has avascular necrosis. She has chronic low back pain. The Percocet twice a day helps her. No side effects no aberrant behavior. Helps with quality of life and activities of daily living. She is also on Mobic for chronic care provider. Plan: We discussed options. Continue Percocet for chronic pain. She will follow-up with Dr. Fab Paul accordingly. Hold off on any low back injections. Questions answered chart was reviewed. She is in agreement with plan.     Allergies:  Red dye, Flagyl [metronidazole], and Vitamin b12    History:  Past Medical History:   Diagnosis Date    Abnormal EKG 2019    Anemia     Chronic back pain     Family history of coronary artery disease 2019    History of tobacco abuse 2019    Hypotension     Morbid obesity ([address] 2019    Osteoarthritis      Past Surgical History:   Procedure Laterality Date     SECTION      CYST REMOVAL  2015    FACE LEFT SIDE    HYSTERECTOMY, TOTAL ABDOMINAL (CERVIX REMOVED)      RAAD/ one ovary remains Family History   Adopted: Yes     Social History     Socioeconomic History    Marital status:      Spouse name: Not on file    Number of children: Not on file    Years of education: Not on file    Highest education level: Not on file   Occupational History    Not on file   Tobacco Use    Smoking status: Former     Packs/day: 0.50     Years: 14.00     Pack years: 7.00     Types: Cigarettes     Quit date: 10/2018     Years since quitting: 3.9    Smokeless tobacco: Current     Types: Snuff, Chew   Vaping Use    Vaping Use: Never used   Substance and Sexual Activity    Alcohol use: Yes     Alcohol/week: 0.0 standard drinks     Comment: RARELY    Drug use: No    Sexual activity: Not Currently   Other Topics Concern    Not on file   Social History Narrative    Not on file     Social Determinants of Health     Financial Resource Strain: Not on file   Food Insecurity: Not on file   Transportation Needs: Not on file   Physical Activity: Not on file   Stress: Not on file   Social Connections: Not on file   Intimate Partner Violence: Not on file   Housing Stability: Not on file       Current Outpatient Medications on File Prior to Visit   Medication Sig Dispense Refill    Meloxicam (MOBIC PO) Take 15 mg by mouth      furosemide (LASIX) 20 MG tablet TK 1 T PO Q 12 H PRF SWELLING OF FEET OR LEGS  2    cephALEXin (KEFLEX) 500 MG capsule TK ONE C PO QID  0    sulfamethoxazole-trimethoprim (BACTRIM DS;SEPTRA DS) 800-160 MG per tablet TK 1 T PO BID  0    MULTIPLE VITAMIN PO Take by mouth      famotidine (PEPCID) 20 MG tablet Take 1 tablet by mouth 2 times daily 20 tablet 0    loratadine (CLARITIN) 10 MG tablet Take 1 tablet by mouth daily 30 tablet 0    tiZANidine (ZANAFLEX) 4 MG tablet TAKE 1 TABLET BY MOUTH TWICE DAILY AS NEEDED FOR SPASMS 60 tablet 0    fentaNYL (DURAGESIC) 12 MCG/HR Place 1 patch onto the skin every 72 hours .  Earliest Fill Date: 3/30/17 10 patch 0    fentaNYL (DURAGESIC) 25 MCG/HR Place 1 patch onto the skin every 72 hours  For pain. 10 patch 0     Current Facility-Administered Medications on File Prior to Visit   Medication Dose Route Frequency Provider Last Rate Last Admin    iopamidol (ISOVUE-370) 76 % injection 1 mL  1 mL Other ONCE PRN Anay Spray, DO        sodium chloride bacteriostatic 0.9 % injection 1 mL  1 mL Injection Once Anay Spray, DO             HPI    Review of Systems    Objective:     Vitals: There were no vitals taken for this visit. PHYSICAL EXAMINATION:    [x] Alert  [x] Oriented to person/place/time  [x] No apparent distress      [x] Mood and affect normal  [x] Recent and remote memory intact  [] Judgement and insight normal     [] OTHER:      Due to this being a TeleHealth encounter, evaluation of the following organ systems is limited: Vitals/Constitutional/EENT/Resp/CV/GI//MS/Neuro/Skin/Heme-Lymph-Imm. Assessment:      Diagnosis Orders   1. Chronic pain syndrome  oxyCODONE-acetaminophen (PERCOCET) 7.5-325 MG per tablet      2. Primary osteoarthritis of right hip        3. Lumbosacral spondylosis without myelopathy  oxyCODONE-acetaminophen (PERCOCET) 7.5-325 MG per tablet          Plan:          Orders Placed This Encounter   Medications    oxyCODONE-acetaminophen (PERCOCET) 7.5-325 MG per tablet     Sig: Take 1 tablet by mouth 2 times daily as needed for Pain for up to 30 days. Dispense:  60 tablet     Refill:  0     Reduce doses taken as pain becomes manageable         No orders of the defined types were placed in this encounter. Follow up:  No follow-ups on file. Verona Bence, DO      >50% of minute appointment was spent with discussion, addressing questions and concerns, including prognosis, treatment options, patient education, and recommendations.       [id number]    Pursuant to the emergency declaration under the [facility], [facility] authority and the [employer] [employer] and [facility] Act, this Virtual  Visit was conducted, with patient's consent, to reduce the patient's risk of exposure to COVID-19 and provide continuity of care for an established patient. Services were provided through an audio discussion to substitute for in-person clinic visit.

## 2022-10-10 DIAGNOSIS — G89.4 CHRONIC PAIN SYNDROME: ICD-10-CM

## 2022-10-10 RX ORDER — OXYCODONE AND ACETAMINOPHEN 7.5; 325 MG/1; MG/1
1 TABLET ORAL 2 TIMES DAILY PRN
Qty: 60 TABLET | Refills: 0 | Status: SHIPPED | OUTPATIENT
Start: 2022-10-15 | End: 2022-11-14

## 2022-10-10 NOTE — TELEPHONE ENCOUNTER
Requested Prescriptions     Pending Prescriptions Disp Refills    oxyCODONE-acetaminophen (PERCOCET) 7.5-325 MG per tablet 60 tablet 0     Sig: Take 1 tablet by mouth 2 times daily as needed for Pain for up to 30 days.        Patient last seen on:  9/15/22  Date of last surgery:  n/a  Date of last refill:  9/15/22  Pain level:  n/a  Patient complaining of:  Pt asks for rx  Future appts: 11/10/22

## 2022-11-14 DIAGNOSIS — G89.4 CHRONIC PAIN SYNDROME: ICD-10-CM

## 2022-11-14 RX ORDER — OXYCODONE AND ACETAMINOPHEN 7.5; 325 MG/1; MG/1
1 TABLET ORAL 2 TIMES DAILY PRN
Qty: 50 TABLET | Refills: 0 | Status: SHIPPED | OUTPATIENT
Start: 2022-11-14 | End: 2022-11-15 | Stop reason: SDUPTHER

## 2022-11-14 RX ORDER — OXYCODONE AND ACETAMINOPHEN 7.5; 325 MG/1; MG/1
1 TABLET ORAL 2 TIMES DAILY PRN
Qty: 50 TABLET | Refills: 0 | OUTPATIENT
Start: 2022-11-14 | End: 2022-12-09

## 2022-11-14 NOTE — TELEPHONE ENCOUNTER
[facility] does not accept insurance.     Please resend to [facility] on tri

## 2022-11-15 RX ORDER — OXYCODONE AND ACETAMINOPHEN 7.5; 325 MG/1; MG/1
1 TABLET ORAL 2 TIMES DAILY PRN
Qty: 50 TABLET | Refills: 0 | Status: SHIPPED | OUTPATIENT
Start: 2022-11-15 | End: 2022-12-10

## 2022-11-15 NOTE — TELEPHONE ENCOUNTER
[facility] so she does have not have to pay.

## 2022-11-15 NOTE — TELEPHONE ENCOUNTER
PT called again requesting that her medication be sent to [facility] because [facility] will not accept her insurance.

## 2022-11-17 ENCOUNTER — HOSPITAL ENCOUNTER (OUTPATIENT)
Dept: ULTRASOUND IMAGING | Age: 47
Discharge: HOME | End: 2022-11-19
Payer: MEDICARE

## 2022-11-17 DIAGNOSIS — R60.0 LOCALIZED EDEMA: ICD-10-CM

## 2022-11-17 PROCEDURE — 93970 EXTREMITY STUDY: CPT

## 2022-12-08 ENCOUNTER — OFFICE VISIT (OUTPATIENT)
Dept: PAIN MANAGEMENT | Age: 47
End: 2022-12-08
Payer: MEDICARE

## 2022-12-08 VITALS
HEIGHT: 67 IN | DIASTOLIC BLOOD PRESSURE: 70 MMHG | BODY MASS INDEX: 36.1 KG/M2 | WEIGHT: 230 LBS | OXYGEN SATURATION: 96 % | TEMPERATURE: 98.5 F | SYSTOLIC BLOOD PRESSURE: 104 MMHG | HEART RATE: 87 BPM

## 2022-12-08 DIAGNOSIS — M51.36 DDD (DEGENERATIVE DISC DISEASE), LUMBAR: ICD-10-CM

## 2022-12-08 DIAGNOSIS — G89.4 CHRONIC PAIN SYNDROME: ICD-10-CM

## 2022-12-08 DIAGNOSIS — M46.1 SACROILIITIS, NOT ELSEWHERE CLASSIFIED (HCC): ICD-10-CM

## 2022-12-08 DIAGNOSIS — M47.817 LUMBOSACRAL SPONDYLOSIS WITHOUT MYELOPATHY: Primary | ICD-10-CM

## 2022-12-08 PROCEDURE — G8417 CALC BMI ABV UP PARAM F/U: HCPCS | Performed by: PAIN MEDICINE

## 2022-12-08 PROCEDURE — 4004F PT TOBACCO SCREEN RCVD TLK: CPT | Performed by: PAIN MEDICINE

## 2022-12-08 PROCEDURE — G8427 DOCREV CUR MEDS BY ELIG CLIN: HCPCS | Performed by: PAIN MEDICINE

## 2022-12-08 PROCEDURE — G8484 FLU IMMUNIZE NO ADMIN: HCPCS | Performed by: PAIN MEDICINE

## 2022-12-08 PROCEDURE — 99213 OFFICE O/P EST LOW 20 MIN: CPT | Performed by: PAIN MEDICINE

## 2022-12-08 RX ORDER — MELOXICAM 15 MG/1
TABLET ORAL
COMMUNITY
Start: 2022-10-10

## 2022-12-08 RX ORDER — OXYCODONE AND ACETAMINOPHEN 7.5; 325 MG/1; MG/1
1 TABLET ORAL 2 TIMES DAILY PRN
Qty: 50 TABLET | Refills: 0 | Status: SHIPPED | OUTPATIENT
Start: 2022-12-08 | End: 2023-01-02

## 2022-12-08 NOTE — PROGRESS NOTES
[facility]) Physicians  Neurosurgery and Pain [facility]  [address], [address] [facility] , [address] [facility] [address], [place]: [phone]  F: [phone]        Nani Min  ()    2022    Subjective:     Nani Min is 49 y.o. female who complains today of:     Chief Complaint   Patient presents with    Back Pain     Patient would like to discuss injections again and also discuss the visit from Dr. Aggie Rowe     Patient here today for follow-up. She has chronic low back pain. She is on Percocet for chronic pain. Helps her function, do her activity living, chores around the house. No side effects or aberrant behavior. Back pain is flared up with regards ablation again. She did see Dr. Meliza Little who would like to hold off as long as possible on replacing her right hip. She takes Percocet for chronic pain of her hip and back. Standing walking and the worst bending bothers her. Plan :continue Percocet for chronic pain. We will set her up for bilateral L3-4-5 RF ablation this is helped over 50% in the past she has failed conservative treatment. Questions answered chart was reviewed.       Allergies:  Red dye, Flagyl [metronidazole], and Vitamin b12    Past Medical History:   Diagnosis Date    Abnormal EKG 2019    Anemia     Chronic back pain     Family history of coronary artery disease 2019    History of tobacco abuse 2019    Hypotension     Morbid obesity ([address] 2019    Osteoarthritis      Past Surgical History:   Procedure Laterality Date     SECTION      CYST REMOVAL  2015    FACE LEFT SIDE    HYSTERECTOMY, TOTAL ABDOMINAL (CERVIX REMOVED)      RAAD/ one ovary remains     Family History   Adopted: Yes     Social History     Socioeconomic History    Marital status:      Spouse name: Not on file    Number of children: Not on file    Years of education: Not on file    Highest education level: Not on file   Occupational History Not on file   Tobacco Use    Smoking status: Former     Packs/day: 0.50     Years: 14.00     Pack years: 7.00     Types: Cigarettes     Quit date: 10/2018     Years since quittin.1    Smokeless tobacco: Current     Types: Snuff, Chew   Vaping Use    Vaping Use: Never used   Substance and Sexual Activity    Alcohol use: Yes     Alcohol/week: 0.0 standard drinks     Comment: RARELY    Drug use: No    Sexual activity: Not Currently   Other Topics Concern    Not on file   Social History Narrative    Not on file     Social Determinants of Health     Financial Resource Strain: Not on file   Food Insecurity: Not on file   Transportation Needs: Not on file   Physical Activity: Not on file   Stress: Not on file   Social Connections: Not on file   Intimate Partner Violence: Not on file   Housing Stability: Not on file       Current Outpatient Medications on File Prior to Visit   Medication Sig Dispense Refill    meloxicam (MOBIC) 15 MG tablet TAKE 1 TABLET BY MOUTH EVERY DAY WITH FOOD AS NEEDED FOR PAIN      furosemide (LASIX) 20 MG tablet TK 1 T PO Q 12 H PRF SWELLING OF FEET OR LEGS  2    MULTIPLE VITAMIN PO Take by mouth      loratadine (CLARITIN) 10 MG tablet Take 1 tablet by mouth daily 30 tablet 0    tiZANidine (ZANAFLEX) 4 MG tablet TAKE 1 TABLET BY MOUTH TWICE DAILY AS NEEDED FOR SPASMS 60 tablet 0     Current Facility-Administered Medications on File Prior to Visit   Medication Dose Route Frequency Provider Last Rate Last Admin    iopamidol (ISOVUE-370) 76 % injection 1 mL  1 mL Other ONCE PRN Conda Dose, DO        sodium chloride bacteriostatic 0.9 % injection 1 mL  1 mL Injection Once Conda Dose, DO               HPI    Review of Systems   All other systems reviewed and are negative.       Objective:     Vitals:  /70 (Site: Right Upper Arm, Position: Sitting, Cuff Size: Large Adult)   Pulse 87   Temp 98.5 °F (36.9 °C) (Temporal)   Ht 5' 7\" (1.702 m)   Wt 230 lb (104.3 kg)   SpO2 96%   BMI 36.02 kg/m² Pain Score:   7      Physical Exam  Patient alert and oriented times three, recent and remote memory intact, mood and affect normal, judgement and insight normal. Strength functional for ambulation. Balance and coordinational functional. Visualized skin intact. No visualized cyanosis, pulses intact. Cranial nerves 2-12 grossly intact. No obvious upper motor neuron signs seen. Sensation intact to light touch. SLR negative. Tender along lumber facet joints with pain and decreased ROM. Extension and rotation with muscle spasms. Assessment:      Diagnosis Orders   1. Lumbosacral spondylosis without myelopathy  [place] RADIOFREQUENCY NEUROTOMY LUMBAR OR SACRAL, W IMAGE GUIDANCE, SINGLE    [place] RADIOFREQ NEUROTOMY LUMBAR OR SACRAL, W IMAGE GUIDE,EA ADDL LEVEL      2. Sacroiliitis, not elsewhere classified ([address]        3. DDD (degenerative disc disease), lumbar        4.  Chronic pain syndrome  oxyCODONE-acetaminophen (PERCOCET) 7.5-325 MG per tablet          Plan:

## 2022-12-15 ENCOUNTER — TELEPHONE (OUTPATIENT)
Dept: PAIN MANAGEMENT | Age: 47
End: 2022-12-15

## 2022-12-15 NOTE — TELEPHONE ENCOUNTER
BILAT L3,4,5 RFA    NO AUTH REQUIRED    OK to schedule procedure approved as above. Please note sides/levels approved and date range.    (If applicable, sides/levels approved may differ from those ordered)    TO BE SCHEDULED WITH DR. Kanwal Fischer

## 2022-12-19 ENCOUNTER — OFFICE VISIT (OUTPATIENT)
Dept: PAIN MANAGEMENT | Age: 47
End: 2022-12-19

## 2022-12-19 DIAGNOSIS — M47.817 LUMBOSACRAL SPONDYLOSIS WITHOUT MYELOPATHY: ICD-10-CM

## 2022-12-19 RX ORDER — BETAMETHASONE SODIUM PHOSPHATE AND BETAMETHASONE ACETATE 3; 3 MG/ML; MG/ML
6 INJECTION, SUSPENSION INTRA-ARTICULAR; INTRALESIONAL; INTRAMUSCULAR; SOFT TISSUE ONCE
Status: COMPLETED | OUTPATIENT
Start: 2022-12-19 | End: 2022-12-19

## 2022-12-19 RX ORDER — LIDOCAINE HYDROCHLORIDE 10 MG/ML
10 INJECTION, SOLUTION EPIDURAL; INFILTRATION; INTRACAUDAL; PERINEURAL ONCE
Status: COMPLETED | OUTPATIENT
Start: 2022-12-19 | End: 2022-12-19

## 2022-12-19 RX ADMIN — LIDOCAINE HYDROCHLORIDE 10 MG: 10 INJECTION, SOLUTION EPIDURAL; INFILTRATION; INTRACAUDAL; PERINEURAL at 15:49

## 2022-12-19 RX ADMIN — BETAMETHASONE SODIUM PHOSPHATE AND BETAMETHASONE ACETATE 6 MG: 3; 3 INJECTION, SUSPENSION INTRA-ARTICULAR; INTRALESIONAL; INTRAMUSCULAR; SOFT TISSUE at 15:50

## 2022-12-19 NOTE — PROGRESS NOTES
[facility]) Physicians  Neurosurgery and Pain [address] [facility] [place]: [phone]  F: [phone]      Lumbar Radio Frequency Ablation     Provider: Angie Villarreal DO          Patient Name: Abigail Tamayo : 1975        Date: 2022      Abigail Tamayo is here today for interventional pain management. Standard ASIPP guidelines were followed and sterile technique used. Area was cleaned with Betadine x3. Informed consent was obtained. Fluoroscopic guidance was used for this procedure. Multiple views of fluoroscopy were used during procedure to assist with needle placement. Appropriate sized RF 10mm active tip needle was used and advance to appropriate anatomic location. There was appropriate multifidus contraction noted with motor stimulation at 2 Hz between 0.5-1.5 volts. No limb or gluteal contraction was noted taking it up to 3.5 volts. Prior to lesioning at 80 degrees Celsius for 90 seconds, approximately 0.75mg/1mg of Celestone and ½ cc of 1% preservative free Lidocaine was injected. Impedance was between 200-500 ohms during the procedure. Patient tolerated the procedure well, no obvious complications occurred during the procedure. Patient was appropriately monitored and discharged home in stable condition with their usual motor strength. Post Op instructions were given to patient.           [x] Bilateral [] T11 [] L1 [] S1     [] T12 [] L2 [] S2    [] Right  [x] L3 [] S3      [x] L4 [] S4    [] Left  [x] L5                              Angie Villarreal DO

## 2023-01-03 DIAGNOSIS — G89.4 CHRONIC PAIN SYNDROME: ICD-10-CM

## 2023-01-03 RX ORDER — OXYCODONE AND ACETAMINOPHEN 7.5; 325 MG/1; MG/1
1 TABLET ORAL 2 TIMES DAILY PRN
Qty: 60 TABLET | Refills: 0 | Status: SHIPPED | OUTPATIENT
Start: 2023-01-03 | End: 2023-02-02

## 2023-01-03 NOTE — TELEPHONE ENCOUNTER
Requested Prescriptions     Pending Prescriptions Disp Refills    oxyCODONE-acetaminophen (PERCOCET) 7.5-325 MG per tablet 60 tablet 0     Sig: Take 1 tablet by mouth 2 times daily as needed for Pain for up to 30 days.  Max Daily Amount: 2 tablets       Patient last seen on:  12/19/22  Date of last surgery:  n/a  Date of last refill:  12/8/22  Pain level:  n/a  Patient complaining of:  Pt asks for a rx  Future appts: 2/2/23

## 2023-01-31 DIAGNOSIS — G89.4 CHRONIC PAIN SYNDROME: ICD-10-CM

## 2023-01-31 RX ORDER — OXYCODONE AND ACETAMINOPHEN 7.5; 325 MG/1; MG/1
1 TABLET ORAL 2 TIMES DAILY PRN
Qty: 44 TABLET | Refills: 0 | Status: SHIPPED | OUTPATIENT
Start: 2023-02-02 | End: 2023-02-24

## 2023-01-31 NOTE — TELEPHONE ENCOUNTER
Requested Prescriptions     Pending Prescriptions Disp Refills    oxyCODONE-acetaminophen (PERCOCET) 7.5-325 MG per tablet 44 tablet 0     Sig: Take 1 tablet by mouth 2 times daily as needed for Pain for up to 22 days.  Max Daily Amount: 2 tablets       Patient last seen on:  12/19  Date of last surgery:  n/a  Date of last refill:  1/3  Pain level:  n/a  Patient complaining of:  PT is asking for refill  Future appts: 2/23

## 2023-02-23 ENCOUNTER — OFFICE VISIT (OUTPATIENT)
Dept: PAIN MANAGEMENT | Age: 48
End: 2023-02-23
Payer: MEDICARE

## 2023-02-23 VITALS
BODY MASS INDEX: 38.45 KG/M2 | HEIGHT: 67 IN | DIASTOLIC BLOOD PRESSURE: 74 MMHG | WEIGHT: 245 LBS | SYSTOLIC BLOOD PRESSURE: 124 MMHG | TEMPERATURE: 97.3 F

## 2023-02-23 DIAGNOSIS — G89.4 CHRONIC PAIN SYNDROME: ICD-10-CM

## 2023-02-23 DIAGNOSIS — M47.817 LUMBOSACRAL SPONDYLOSIS WITHOUT MYELOPATHY: Primary | ICD-10-CM

## 2023-02-23 DIAGNOSIS — M46.1 SACROILIITIS, NOT ELSEWHERE CLASSIFIED (HCC): ICD-10-CM

## 2023-02-23 DIAGNOSIS — M16.11 PRIMARY OSTEOARTHRITIS OF RIGHT HIP: ICD-10-CM

## 2023-02-23 PROCEDURE — 99213 OFFICE O/P EST LOW 20 MIN: CPT | Performed by: PAIN MEDICINE

## 2023-02-23 PROCEDURE — 4004F PT TOBACCO SCREEN RCVD TLK: CPT | Performed by: PAIN MEDICINE

## 2023-02-23 PROCEDURE — G8427 DOCREV CUR MEDS BY ELIG CLIN: HCPCS | Performed by: PAIN MEDICINE

## 2023-02-23 PROCEDURE — G8417 CALC BMI ABV UP PARAM F/U: HCPCS | Performed by: PAIN MEDICINE

## 2023-02-23 PROCEDURE — G8484 FLU IMMUNIZE NO ADMIN: HCPCS | Performed by: PAIN MEDICINE

## 2023-02-23 RX ORDER — OXYCODONE AND ACETAMINOPHEN 7.5; 325 MG/1; MG/1
1 TABLET ORAL 2 TIMES DAILY PRN
Qty: 60 TABLET | Refills: 0 | Status: SHIPPED | OUTPATIENT
Start: 2023-02-23 | End: 2023-03-25

## 2023-02-23 NOTE — PROGRESS NOTES
[facility] ([facility]) Physicians  Neurosurgery and Pain [address] [facility], [place]: [phone]  F: [phone]        Oleg Jay  ([birth year])    2023    Subjective:     Oleg Jay is 49 y.o. female who complains today of:     Chief Complaint   Patient presents with    Back Pain     Patient here today for follow-up. History of RF ablation in December which helped him 50%. She takes the Percocet for chronic pain. Which helps her function, do her activities day living, chores around the house. No side effects or aberrant behavior. Pain worse with bending activity better with rest.  Patient has significant pain in the right hip with arthritis but is trying to stay as active as possible. Plan: Nancie Mary was reviewed. Continue Percocet for chronic pain. Continue home excise program as tolerated. Questions answered chart was reviewed. We talked about good dietary habits. We will see her in 2 months.       Allergies:  Red dye, Flagyl [metronidazole], and Vitamin b12    Past Medical History:   Diagnosis Date    Abnormal EKG 2019    Anemia     Chronic back pain     Family history of coronary artery disease 2019    History of tobacco abuse 2019    Hypotension     Morbid obesity ([address] 2019    Osteoarthritis      Past Surgical History:   Procedure Laterality Date     SECTION      CYST REMOVAL  2015    FACE LEFT SIDE    HYSTERECTOMY, TOTAL ABDOMINAL (CERVIX REMOVED)      RAAD/ one ovary remains     Family History   Adopted: Yes     Social History     Socioeconomic History    Marital status:      Spouse name: Not on file    Number of children: Not on file    Years of education: Not on file    Highest education level: Not on file   Occupational History    Not on file   Tobacco Use    Smoking status: Former     Packs/day: 0.50     Years: 14.00     Pack years: 7.00     Types: Cigarettes     Quit date: 10/2018 Years since quittin.4    Smokeless tobacco: Current     Types: Snuff, Chew   Vaping Use    Vaping Use: Never used   Substance and Sexual Activity    Alcohol use: Yes     Alcohol/week: 0.0 standard drinks     Comment: RARELY    Drug use: No    Sexual activity: Not Currently   Other Topics Concern    Not on file   Social History Narrative    Not on file     Social Determinants of Health     Financial Resource Strain: Not on file   Food Insecurity: Not on file   Transportation Needs: Not on file   Physical Activity: Not on file   Stress: Not on file   Social Connections: Not on file   Intimate Partner Violence: Not on file   Housing Stability: Not on file       Current Outpatient Medications on File Prior to Visit   Medication Sig Dispense Refill    meloxicam (MOBIC) 15 MG tablet TAKE 1 TABLET BY MOUTH EVERY DAY WITH FOOD AS NEEDED FOR PAIN      furosemide (LASIX) 20 MG tablet TK 1 T PO Q 12 H PRF SWELLING OF FEET OR LEGS  2    MULTIPLE VITAMIN PO Take by mouth      loratadine (CLARITIN) 10 MG tablet Take 1 tablet by mouth daily 30 tablet 0    tiZANidine (ZANAFLEX) 4 MG tablet TAKE 1 TABLET BY MOUTH TWICE DAILY AS NEEDED FOR SPASMS 60 tablet 0     Current Facility-Administered Medications on File Prior to Visit   Medication Dose Route Frequency Provider Last Rate Last Admin    iopamidol (ISOVUE-370) 76 % injection 1 mL  1 mL Other ONCE PRN Crispin Juli, DO        sodium chloride bacteriostatic 0.9 % injection 1 mL  1 mL Injection Once Crispin Juli, DO               HPI    Review of Systems   All other systems reviewed and are negative.       Objective:     Vitals:  /74 (Site: Left Upper Arm, Position: Sitting, Cuff Size: Large Adult)   Temp 97.3 °F (36.3 °C) (Temporal)   Ht 5' 7\" (1.702 m)   Wt 245 lb (111.1 kg)   BMI 38.37 kg/m² Pain Score:   8      Physical Exam  Patient alert and oriented times three, recent and remote memory intact, mood and affect normal, judgement and insight normal. Strength functional for ambulation. Balance and coordinational functional. Visualized skin intact. No visualized cyanosis, pulses intact. Cranial nerves 2-12 grossly intact. No obvious upper motor neuron signs seen. Sensation intact to light touch. SLR negative. Tender along lumber facet joints with pain and decreased ROM. Extension and rotation with muscle spasms. Assessment:      Diagnosis Orders   1. Lumbosacral spondylosis without myelopathy        2. Chronic pain syndrome  oxyCODONE-acetaminophen (PERCOCET) 7.5-325 MG per tablet      3. Sacroiliitis, not elsewhere classified ([facility] [address]        4.  Primary osteoarthritis of right hip            Plan:

## 2023-03-23 DIAGNOSIS — G89.4 CHRONIC PAIN SYNDROME: ICD-10-CM

## 2023-03-23 RX ORDER — OXYCODONE AND ACETAMINOPHEN 7.5; 325 MG/1; MG/1
1 TABLET ORAL 2 TIMES DAILY PRN
Qty: 60 TABLET | Refills: 0 | Status: SHIPPED | OUTPATIENT
Start: 2023-03-23 | End: 2023-04-22

## 2023-03-23 NOTE — TELEPHONE ENCOUNTER
Requested Prescriptions     Pending Prescriptions Disp Refills    oxyCODONE-acetaminophen (PERCOCET) 7.5-325 MG per tablet 60 tablet 0     Sig: Take 1 tablet by mouth 2 times daily as needed for Pain for up to 30 days.  Max Daily Amount: 2 tablets       Patient last seen on:  2/23  Date of last surgery:  n/a  Date of last refill:  2/23  Pain level:  n/a  Patient complaining of:  PT is asking for refill  Future appts: 4/20

## 2023-04-20 ENCOUNTER — TELEPHONE (OUTPATIENT)
Dept: PAIN MANAGEMENT | Age: 48
End: 2023-04-20

## 2023-04-20 DIAGNOSIS — G89.4 CHRONIC PAIN SYNDROME: ICD-10-CM

## 2023-04-20 RX ORDER — OXYCODONE AND ACETAMINOPHEN 7.5; 325 MG/1; MG/1
1 TABLET ORAL 2 TIMES DAILY PRN
Qty: 60 TABLET | Refills: 0 | Status: SHIPPED | OUTPATIENT
Start: 2023-04-20 | End: 2023-05-20
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
## 2023-04-21 NOTE — TELEPHONE ENCOUNTER
Spoke with patient, scheduled sooner appointment with SK. She will also contact Dr. Jose Holliday office.

## 2023-05-04 ENCOUNTER — TELEPHONE (OUTPATIENT)
Dept: PAIN MANAGEMENT | Age: 48
End: 2023-05-04

## 2023-05-04 DIAGNOSIS — G89.4 CHRONIC PAIN SYNDROME: ICD-10-CM

## 2023-05-04 RX ORDER — OXYCODONE AND ACETAMINOPHEN 7.5; 325 MG/1; MG/1
1 TABLET ORAL 3 TIMES DAILY PRN
Qty: 90 TABLET | Refills: 0 | Status: SHIPPED | OUTPATIENT
Start: 2023-05-04 | End: 2023-06-03

## 2023-05-04 NOTE — TELEPHONE ENCOUNTER
Patient contacted regarding her hip pain. Dr. Jessica Jimenez increased percocet to three times daily. Will reassess next month to see how this helps with her increasing pain.

## 2023-06-05 DIAGNOSIS — G89.4 CHRONIC PAIN SYNDROME: ICD-10-CM

## 2023-06-05 RX ORDER — OXYCODONE AND ACETAMINOPHEN 7.5; 325 MG/1; MG/1
1 TABLET ORAL 3 TIMES DAILY PRN
Qty: 90 TABLET | Refills: 0 | Status: SHIPPED | OUTPATIENT
Start: 2023-06-05 | End: 2023-07-05

## 2023-06-05 NOTE — TELEPHONE ENCOUNTER
Patient said that Dr. Neymar Clemente is doing her hip surgery on 7/18/23 and that he needs clearance from all of her doctors. She needs a refill and an appointment but you have no f/u appointments available before her surgery on 7/18. Requested Prescriptions     Pending Prescriptions Disp Refills    oxyCODONE-acetaminophen (PERCOCET) 7.5-325 MG per tablet 90 tablet 0     Sig: Take 1 tablet by mouth 3 times daily as needed for Pain for up to 30 days.  Max Daily Amount: 3 tablets       Patient last seen on:  2/23/23  Date of last surgery:  7/18/23 hip surgery by Dr. Neymar Clemente  Date of last refill:  5/4/23   Pain level:  n/a  Patient complaining of:  she only wants to see you and you do not have any opening in June/July before her surgery  Future appts: none available

## 2023-06-20 ENCOUNTER — HOSPITAL ENCOUNTER (OUTPATIENT)
Dept: LAB | Age: 48
Discharge: HOME | End: 2023-06-20
Payer: MEDICARE

## 2023-06-20 LAB
ALBUMIN SERPL-MCNC: 4.4 G/DL (ref 3.5–4.6)
ALP SERPL-CCNC: 128 U/L (ref 40–130)
ALT SERPL-CCNC: 15 U/L (ref 0–33)
ANION GAP SERPL CALCULATED.3IONS-SCNC: 14 MEQ/L (ref 9–15)
APTT PPP: 35.9 SEC (ref 24.4–36.8)
AST SERPL-CCNC: 18 U/L (ref 0–35)
BASOPHILS # BLD: 0.1 K/UL (ref 0–0.2)
BASOPHILS NFR BLD: 0.8 %
BILIRUB SERPL-MCNC: 0.3 MG/DL (ref 0.2–0.7)
BUN SERPL-MCNC: 17 MG/DL (ref 6–20)
CALCIUM SERPL-MCNC: 9.4 MG/DL (ref 8.5–9.9)
CHLORIDE SERPL-SCNC: 100 MEQ/L (ref 95–107)
CO2 SERPL-SCNC: 25 MEQ/L (ref 20–31)
CREAT SERPL-MCNC: 0.86 MG/DL (ref 0.5–0.9)
EOSINOPHIL # BLD: 0.1 K/UL (ref 0–0.7)
EOSINOPHIL NFR BLD: 0.8 %
ERYTHROCYTE [DISTWIDTH] IN BLOOD BY AUTOMATED COUNT: 14.3 % (ref 11.5–14.5)
ERYTHROCYTE [SEDIMENTATION RATE] IN BLOOD BY WESTERGREN METHOD: 20 MM (ref 0–20)
GLOBULIN SER CALC-MCNC: 3.6 G/DL (ref 2.3–3.5)
GLUCOSE SERPL-MCNC: 101 MG/DL (ref 70–99)
HCT VFR BLD AUTO: 40.5 % (ref 37–47)
HGB BLD-MCNC: 13 G/DL (ref 12–16)
INR PPP: 1
LYMPHOCYTES # BLD: 2.9 K/UL (ref 1–4.8)
LYMPHOCYTES NFR BLD: 30.9 %
MCH RBC QN AUTO: 27.4 PG (ref 27–31.3)
MCHC RBC AUTO-ENTMCNC: 32 % (ref 33–37)
MCV RBC AUTO: 85.7 FL (ref 79.4–94.8)
MONOCYTES # BLD: 0.4 K/UL (ref 0.2–0.8)
MONOCYTES NFR BLD: 4.7 %
NEUTROPHILS # BLD: 5.9 K/UL (ref 1.4–6.5)
NEUTS SEG NFR BLD: 62.8 %
PLATELET # BLD AUTO: 401 K/UL (ref 130–400)
POTASSIUM SERPL-SCNC: 4.2 MEQ/L (ref 3.4–4.9)
PROT SERPL-MCNC: 8 G/DL (ref 6.3–8)
PROTHROMBIN TIME: 12.9 SEC (ref 12.3–14.9)
RBC # BLD AUTO: 4.72 M/UL (ref 4.2–5.4)
SODIUM SERPL-SCNC: 139 MEQ/L (ref 135–144)
WBC # BLD AUTO: 9.4 K/UL (ref 4.8–10.8)

## 2023-06-20 PROCEDURE — 85660 RBC SICKLE CELL TEST: CPT

## 2023-06-20 PROCEDURE — 82164 ANGIOTENSIN I ENZYME TEST: CPT

## 2023-06-20 PROCEDURE — 80053 COMPREHEN METABOLIC PANEL: CPT

## 2023-06-20 PROCEDURE — 85652 RBC SED RATE AUTOMATED: CPT

## 2023-06-20 PROCEDURE — 36415 COLL VENOUS BLD VENIPUNCTURE: CPT

## 2023-06-20 PROCEDURE — 85025 COMPLETE CBC W/AUTO DIFF WBC: CPT

## 2023-06-20 PROCEDURE — 85610 PROTHROMBIN TIME: CPT

## 2023-06-20 PROCEDURE — 85730 THROMBOPLASTIN TIME PARTIAL: CPT

## 2023-06-20 PROCEDURE — 86039 ANTINUCLEAR ANTIBODIES (ANA): CPT

## 2023-06-21 LAB — SICKLE CELL SCREEN: NEGATIVE

## 2023-06-22 LAB — ACE SERPL-CCNC: 59 U/L (ref 16–85)

## 2023-06-23 LAB
ANA PAT SER IF-IMP: NORMAL
NUCLEAR IGG SER QL IF: NORMAL

## 2023-07-03 DIAGNOSIS — G89.4 CHRONIC PAIN SYNDROME: ICD-10-CM

## 2023-07-03 NOTE — TELEPHONE ENCOUNTER
Requested Prescriptions     Pending Prescriptions Disp Refills    oxyCODONE-acetaminophen (PERCOCET) 7.5-325 MG per tablet 90 tablet 0     Sig: Take 1 tablet by mouth 3 times daily as needed for Pain for up to 30 days. Max Daily Amount: 3 tablets       Patient last seen on:  2-23-23  Date of last refill:  6-5-23  Future appts: None     Pt left message asking for refill and states she has finally be authorized for a total hip replacement and will be having surgery in 15 days. It is almost unbearable to walk. Pt states Dr. Sanjuanita Bamberger will be in touch once pt has surgery.

## 2023-07-05 RX ORDER — OXYCODONE AND ACETAMINOPHEN 7.5; 325 MG/1; MG/1
1 TABLET ORAL 3 TIMES DAILY PRN
Qty: 90 TABLET | Refills: 0 | Status: SHIPPED | OUTPATIENT
Start: 2023-07-05 | End: 2023-08-04

## 2023-07-19 ENCOUNTER — TELEPHONE (OUTPATIENT)
Dept: PAIN MANAGEMENT | Age: 48
End: 2023-07-19

## 2023-07-19 NOTE — TELEPHONE ENCOUNTER
Received a call from [facility]useb[facility]on [facility] requesting to speak to Dr Genesis Addison or a CNP regarding patient's pain medication. He stated patient had a hip replacement and the current prescription for percocet 7.5-325 mg 1 tablet 3 times a day will not be enough. He would like to speak to someone to get advise on how to help manage the patient's pain. His cell phone number is [phone].

## 2023-07-21 NOTE — TELEPHONE ENCOUNTER
Discharged 7/19/23   Was on Percocet 7.5/325 mg TID  Instructed to take Percocet 7.5/325 mg QID  Flexeril added. She will require early refill of Percocet 7.5/325 mg    -Please call the patient and ask how her pain is doing and when she will be due for pain medication  -Please ensure follow up is scheduled with pain management    Controlled Substance Monitoring:    Acute and Chronic Pain Monitoring:   RX Monitoring 7/21/2023   Attestation -   Periodic Controlled Substance Monitoring Obtaining appropriate analgesic effect of treatment.    Chronic Pain > 80 MEDD -

## 2023-07-24 DIAGNOSIS — G89.4 CHRONIC PAIN SYNDROME: Primary | ICD-10-CM

## 2023-07-24 RX ORDER — OXYCODONE AND ACETAMINOPHEN 10; 325 MG/1; MG/1
1 TABLET ORAL EVERY 6 HOURS PRN
Qty: 120 TABLET | Refills: 0 | Status: SHIPPED | OUTPATIENT
Start: 2023-07-24 | End: 2023-07-27 | Stop reason: SDUPTHER

## 2023-07-24 NOTE — TELEPHONE ENCOUNTER
Patient said the pain where the incision is painful. Patient stated she was given a fentanyl patch to help with the pain which she took off on Saturday. She said she was taking a muscle relaxer. Patient stated she has 12 percocet 7.5 left. Patient stated she is currently taking them every 4 hours. Patient wants to know if this can be increased temporarily. She stated she is not able to leave the house just yet due to the pain.

## 2023-07-27 DIAGNOSIS — G89.4 CHRONIC PAIN SYNDROME: ICD-10-CM

## 2023-07-27 RX ORDER — OXYCODONE AND ACETAMINOPHEN 10; 325 MG/1; MG/1
1 TABLET ORAL EVERY 6 HOURS PRN
Qty: 120 TABLET | Refills: 0 | Status: SHIPPED | OUTPATIENT
Start: 2023-07-27 | End: 2023-08-26

## 2023-07-27 NOTE — TELEPHONE ENCOUNTER
PT states usual pharmacy is out, she is asking if it can be sent to [facility] in [address] [facility].

## 2023-08-23 DIAGNOSIS — G89.4 CHRONIC PAIN SYNDROME: ICD-10-CM

## 2023-08-23 RX ORDER — OXYCODONE AND ACETAMINOPHEN 10; 325 MG/1; MG/1
1 TABLET ORAL EVERY 6 HOURS PRN
Qty: 120 TABLET | Refills: 0 | Status: SHIPPED | OUTPATIENT
Start: 2023-08-23 | End: 2023-09-22

## 2023-08-23 NOTE — TELEPHONE ENCOUNTER
Requested Prescriptions     Pending Prescriptions Disp Refills    oxyCODONE-acetaminophen (PERCOCET)  MG per tablet 120 tablet 0     Sig: Take 1 tablet by mouth every 6 hours as needed for Pain for up to 30 days. Intended supply: 30 days Max Daily Amount: 4 tablets       Patient last seen on:  2/23  Date of last surgery:  n/a  Date of last refill:  7/27  Pain level:  n/a  Patient complaining of:  PT is asking for refill.   Future appts: 10/5

## 2023-09-20 DIAGNOSIS — G89.4 CHRONIC PAIN SYNDROME: ICD-10-CM

## 2023-09-20 RX ORDER — OXYCODONE AND ACETAMINOPHEN 10; 325 MG/1; MG/1
1 TABLET ORAL EVERY 6 HOURS PRN
Qty: 120 TABLET | Refills: 0 | Status: SHIPPED | OUTPATIENT
Start: 2023-09-20 | End: 2023-10-20

## 2023-09-20 NOTE — TELEPHONE ENCOUNTER
Requested Prescriptions     Pending Prescriptions Disp Refills    oxyCODONE-acetaminophen (PERCOCET)  MG per tablet 120 tablet 0     Sig: Take 1 tablet by mouth every 6 hours as needed for Pain for up to 30 days. Intended supply: 30 days Max Daily Amount: 4 tablets       Patient last seen on:  02/23/2023  Date of last surgery:  hip surgery  Date of last refill:  08/23/2023  Pain level:  n/a  Patient complaining of:  patient lvm requesting refill for percocet. Patient stated she is still doing physical therapy for her hip replacement that she had.    Future appts: 10/05/2023

## 2023-09-21 ENCOUNTER — TELEPHONE (OUTPATIENT)
Dept: PAIN MANAGEMENT | Age: 48
End: 2023-09-21

## 2023-09-21 NOTE — TELEPHONE ENCOUNTER
Patient states that she is still in physical therapy for her hip surgery. She says that she is having difficulty with it due to increased back pain. She is asking if Dr Clay Sheppard could order the lumbar RFA as soon as possible as she thinks she is over do for one.

## 2023-09-25 ENCOUNTER — OFFICE VISIT (OUTPATIENT)
Dept: PAIN MANAGEMENT | Age: 48
End: 2023-09-25
Payer: MEDICARE

## 2023-09-25 DIAGNOSIS — M47.817 LUMBOSACRAL SPONDYLOSIS WITHOUT MYELOPATHY: Primary | ICD-10-CM

## 2023-09-25 PROCEDURE — 64636 DESTROY L/S FACET JNT ADDL: CPT | Performed by: PAIN MEDICINE

## 2023-09-25 PROCEDURE — 64635 DESTROY LUMB/SAC FACET JNT: CPT | Performed by: PAIN MEDICINE

## 2023-09-25 RX ORDER — BETAMETHASONE SODIUM PHOSPHATE AND BETAMETHASONE ACETATE 3; 3 MG/ML; MG/ML
6 INJECTION, SUSPENSION INTRA-ARTICULAR; INTRALESIONAL; INTRAMUSCULAR; SOFT TISSUE ONCE
Status: COMPLETED | OUTPATIENT
Start: 2023-09-25 | End: 2023-09-25

## 2023-09-25 RX ORDER — LIDOCAINE HYDROCHLORIDE 10 MG/ML
3 INJECTION, SOLUTION EPIDURAL; INFILTRATION; INTRACAUDAL; PERINEURAL ONCE
Status: COMPLETED | OUTPATIENT
Start: 2023-09-25 | End: 2023-09-25

## 2023-09-25 RX ADMIN — LIDOCAINE HYDROCHLORIDE 3 ML: 10 INJECTION, SOLUTION EPIDURAL; INFILTRATION; INTRACAUDAL; PERINEURAL at 16:41

## 2023-09-25 RX ADMIN — BETAMETHASONE SODIUM PHOSPHATE AND BETAMETHASONE ACETATE 6 MG: 3; 3 INJECTION, SUSPENSION INTRA-ARTICULAR; INTRALESIONAL; INTRAMUSCULAR; SOFT TISSUE at 16:41

## 2023-10-03 PROBLEM — M25.559 HIP JOINT PAIN: Status: ACTIVE | Noted: 2023-10-03

## 2023-10-03 PROBLEM — M16.11 PRIMARY OSTEOARTHRITIS OF RIGHT HIP: Status: ACTIVE | Noted: 2023-10-03

## 2023-10-04 PROBLEM — S73.199A LABRAL TEAR OF HIP JOINT: Status: ACTIVE | Noted: 2023-10-04

## 2023-10-04 RX ORDER — AMMONIUM LACTATE 12 G/100G
CREAM TOPICAL 3 TIMES DAILY PRN
COMMUNITY
Start: 2023-04-22

## 2023-10-04 RX ORDER — OXYCODONE AND ACETAMINOPHEN 7.5; 325 MG/1; MG/1
TABLET ORAL
COMMUNITY
Start: 2023-07-05

## 2023-10-04 RX ORDER — MELOXICAM 15 MG/1
15 TABLET ORAL DAILY PRN
COMMUNITY
Start: 2022-11-16

## 2023-10-04 RX ORDER — FUROSEMIDE 20 MG/1
1-2 TABLET ORAL EVERY 12 HOURS PRN
COMMUNITY
Start: 2022-11-16

## 2023-10-04 RX ORDER — DICLOFENAC SODIUM 10 MG/G
2 GEL TOPICAL 4 TIMES DAILY PRN
COMMUNITY
Start: 2023-09-19

## 2023-10-04 RX ORDER — OXYCODONE AND ACETAMINOPHEN 10; 325 MG/1; MG/1
TABLET ORAL
COMMUNITY
Start: 2023-09-23

## 2023-10-05 ENCOUNTER — APPOINTMENT (OUTPATIENT)
Dept: PHYSICAL THERAPY | Facility: CLINIC | Age: 48
End: 2023-10-05
Payer: COMMERCIAL

## 2023-10-06 NOTE — PROGRESS NOTES
"Physical Therapy Treatment    Patient Name: Christal De La Cruz  MRN: [id number]  Today's Date: 10/9/2023  Time Calculation  Start Time: 1050  Stop Time: 1129  Time Calculation (min): 39 min    Current Problem  1. Pain of right hip joint        2. Primary osteoarthritis of right hip            Precautions  Hip precautions     Pain  Pain Assessment: 0-10  Pain Score: 6  Pain Location: Hip      Subjective:  General  Pt stating she rolled her R ankle last week so everything was hurting. Has some back and hip pain but thinks if from the colder weather.     Insurance:  Visit number: 8  Approved number of visits: 12  Date range: 9-25/10/27  [facility] Community Plan    Objective    Treatments:  Viet 6 min  Bridges 2x15  Clamshells RTB 2x15  SLR 2x10  Standing hip abd/ext GTB 2x10  Flight of stairs x2  Squats 2x15  HR/TR on 1/2 roll 2x10  SLS 10\"x10  Side stepping GTB in parallel bars x3L   Leg press # 2x10  Assessment:  Progressed resistance with standing hip strengthening noticing some difficulty with hip extension. Also observed shaking in her R knee with mini squats today. Added leg press without much difficulty. Educated pt on normal soreness with progressions.     Plan:  Continue to progress strength                   "

## 2023-10-09 ENCOUNTER — TREATMENT (OUTPATIENT)
Dept: PHYSICAL THERAPY | Facility: CLINIC | Age: 48
End: 2023-10-09
Payer: COMMERCIAL

## 2023-10-09 DIAGNOSIS — M25.551 PAIN OF RIGHT HIP JOINT: Primary | ICD-10-CM

## 2023-10-09 DIAGNOSIS — M16.11 PRIMARY OSTEOARTHRITIS OF RIGHT HIP: ICD-10-CM

## 2023-10-09 PROCEDURE — 97110 THERAPEUTIC EXERCISES: CPT | Mod: GP,CQ

## 2023-10-09 ASSESSMENT — PAIN - FUNCTIONAL ASSESSMENT: PAIN_FUNCTIONAL_ASSESSMENT: 0-10

## 2023-10-09 ASSESSMENT — PAIN SCALES - GENERAL: PAINLEVEL_OUTOF10: 6

## 2023-10-12 ENCOUNTER — APPOINTMENT (OUTPATIENT)
Dept: ORTHOPEDIC SURGERY | Facility: CLINIC | Age: 48
End: 2023-10-12
Payer: COMMERCIAL

## 2023-10-12 ENCOUNTER — APPOINTMENT (OUTPATIENT)
Dept: PHYSICAL THERAPY | Facility: CLINIC | Age: 48
End: 2023-10-12
Payer: COMMERCIAL

## 2023-10-12 NOTE — PROGRESS NOTES
"Physical Therapy Treatment    Patient Name: Christal De La Cruz  MRN: [id number]  Today's Date: 10/12/2023       Current Problem  No diagnosis found.      Precautions  Hip precautions     Pain         Subjective:  General  .     Insurance:  Visit number: 9  Approved number of visits: 12  Date range: 9-25/10/27  [facility] Community Plan    Objective    Treatments:  Viet 6 min  Bridges 2x15  Clamshells RTB 2x15  SLR 2x10  Standing hip abd/ext GTB 2x10  Flight of stairs x2  Squats 2x15  HR/TR on 1/2 roll 2x10  SLS 10\"x10  Side stepping GTB in parallel bars x3L   Leg press # 2x10  Assessment:      Plan:  Continue to progress strength          GOALS  Pt will demonstrate increased global Hip strength with MMT 4+/5 in order to return to doing self care tasks independently.  Pt will be independent with HEP.  Pt will ambulate 150ft without use of AD in orderto retrun to activities in the community.  Pt will demonstrate increased global knee strength with MMT 4+/5 in order to improve gait mechanics.  Pt will increase LEFS score by 9 points (MDC) in order to increase participation in household chores.  - Baseline score 19/80 on 7/31/23  Pt will report decrease in pain to 0-1/10 in order to return to ADLs.  Pt will demonstrate increased hip ROM to 0-100 improve stair navigation.  Pt will be able navigate 1 flight of stairs with reciprocal stepping pattern in order to return to ADLs         "

## 2023-10-19 DIAGNOSIS — G89.4 CHRONIC PAIN SYNDROME: ICD-10-CM

## 2023-10-19 RX ORDER — OXYCODONE AND ACETAMINOPHEN 10; 325 MG/1; MG/1
1 TABLET ORAL EVERY 6 HOURS PRN
Qty: 120 TABLET | Refills: 0 | Status: SHIPPED | OUTPATIENT
Start: 2023-10-19 | End: 2023-11-18

## 2023-11-16 DIAGNOSIS — G89.4 CHRONIC PAIN SYNDROME: ICD-10-CM

## 2023-11-16 RX ORDER — OXYCODONE AND ACETAMINOPHEN 10; 325 MG/1; MG/1
1 TABLET ORAL EVERY 6 HOURS PRN
Qty: 120 TABLET | Refills: 0 | Status: SHIPPED | OUTPATIENT
Start: 2023-11-18 | End: 2023-12-18

## 2023-11-16 NOTE — TELEPHONE ENCOUNTER
Requested Prescriptions     Pending Prescriptions Disp Refills    oxyCODONE-acetaminophen (PERCOCET)  MG per tablet 120 tablet 0     Sig: Take 1 tablet by mouth every 6 hours as needed for Pain for up to 30 days. Intended supply: 30 days Max Daily Amount: 4 tablets       Patient last seen on:  9/25  Date of last surgery:  n/a  Date of last refull:  10/19  Pain level:  n/a  Patient complaining of:  PT is asking for refill.   Future appts: none

## 2023-11-21 ENCOUNTER — HOSPITAL ENCOUNTER (OUTPATIENT)
Dept: ORTHOPEDIC SURGERY | Age: 48
Discharge: HOME | End: 2023-11-23
Payer: MEDICARE

## 2023-11-21 ENCOUNTER — OFFICE VISIT (OUTPATIENT)
Dept: ORTHOPEDIC SURGERY | Age: 48
End: 2023-11-21
Payer: MEDICARE

## 2023-11-21 ENCOUNTER — TELEPHONE (OUTPATIENT)
Dept: PAIN MANAGEMENT | Age: 48
End: 2023-11-21

## 2023-11-21 VITALS — WEIGHT: 235 LBS | BODY MASS INDEX: 37.77 KG/M2 | HEIGHT: 66 IN

## 2023-11-21 DIAGNOSIS — N31.9 BLADDER DYSFUNCTION: ICD-10-CM

## 2023-11-21 DIAGNOSIS — M54.50 LOW BACK PAIN, UNSPECIFIED BACK PAIN LATERALITY, UNSPECIFIED CHRONICITY, UNSPECIFIED WHETHER SCIATICA PRESENT: ICD-10-CM

## 2023-11-21 DIAGNOSIS — M54.50 LOW BACK PAIN, UNSPECIFIED BACK PAIN LATERALITY, UNSPECIFIED CHRONICITY, UNSPECIFIED WHETHER SCIATICA PRESENT: Primary | ICD-10-CM

## 2023-11-21 PROCEDURE — G8484 FLU IMMUNIZE NO ADMIN: HCPCS | Performed by: ORTHOPAEDIC SURGERY

## 2023-11-21 PROCEDURE — 72110 X-RAY EXAM L-2 SPINE 4/>VWS: CPT | Performed by: ORTHOPAEDIC SURGERY

## 2023-11-21 PROCEDURE — 99203 OFFICE O/P NEW LOW 30 MIN: CPT | Performed by: ORTHOPAEDIC SURGERY

## 2023-11-21 PROCEDURE — G8427 DOCREV CUR MEDS BY ELIG CLIN: HCPCS | Performed by: ORTHOPAEDIC SURGERY

## 2023-11-21 PROCEDURE — G8417 CALC BMI ABV UP PARAM F/U: HCPCS | Performed by: ORTHOPAEDIC SURGERY

## 2023-11-21 PROCEDURE — 72110 X-RAY EXAM L-2 SPINE 4/>VWS: CPT

## 2023-11-21 NOTE — TELEPHONE ENCOUNTER
Patient called just wanting to let you know that her PCP, Dr. Alda Aschoff, referred her to Dr. Shelby Brady to deal with her bladder issues post hip surgery. She will still be seeing you regarding her back issues.

## 2023-11-21 NOTE — PROGRESS NOTES
Subjective:      Patient ID: Celso Chung is a 51 y.o. female who presents today for:  Chief Complaint   Patient presents with    New Patient     Pt presents today for a new patient apt for lower back pain and decreased bladder control. Pt was referred by Dr. Serjio Gordon. This pain started July 2023. Symptoms Include pain, decreased bladder control. Denies injury  Pain radiates to right hip. The pain disturbs pts sleep. Pain worsens with walking, prolonged standing. Tried the following treatments injections, RFA, pain meds, PT. Taking oxycodone for pain. PT does see pain management. She is a non-smoker. Subjective/Objective/Assessment/Plan:     SUBJECTIVE -the patient is here with complaints of low back pain. She does not describe symptoms into her legs on her pain diagram.  She has had issues with bladder control. She states that at times she will look down and see that she has lost her bladder. However most of the time she can sense when she is needing to urinate and can get to the bathroom. She is sent to me to evaluate her lumbar spine    OBJECTIVE -neurovascular intact bilateral lower extremities  XR LUMBAR SPINE (MIN 4 VIEWS)  4 views lumbar spine. Right total hip arthroplasty. Grade 2   spondylolisthesis at L4-5. Severe degenerative disc disease at L4-5 and   L5-S1.      ASSESSMENT -    Diagnosis Orders   1. Low back pain, unspecified back pain laterality, unspecified chronicity, unspecified whether sciatica present  XR LUMBAR SPINE (MIN 4 VIEWS)    MRI LUMBAR SPINE WO CONTRAST      2. Bladder dysfunction  Ambulatory referral to Urology          PLAN -I would like her cleared by urology prior to proceeding with any type of back surgery. She states that this began in July 2023 after she woke up from right total hip arthroplasty. She has been having injections with Dr. Marco White. I am ordering an MRI of her lumbar spine.   I believe that at some point she is in need a of a two-level fusion

## 2023-11-22 DIAGNOSIS — G89.4 CHRONIC PAIN SYNDROME: ICD-10-CM

## 2023-11-22 RX ORDER — OXYCODONE AND ACETAMINOPHEN 10; 325 MG/1; MG/1
1 TABLET ORAL EVERY 6 HOURS PRN
Qty: 120 TABLET | Refills: 0 | Status: SHIPPED | OUTPATIENT
Start: 2023-11-22 | End: 2023-12-22

## 2023-11-28 ENCOUNTER — HOSPITAL ENCOUNTER (OUTPATIENT)
Dept: LAB | Age: 48
Discharge: HOME | End: 2023-11-28
Payer: MEDICARE

## 2023-11-28 ENCOUNTER — HOSPITAL ENCOUNTER (OUTPATIENT)
Dept: MRI IMAGING | Age: 48
Discharge: HOME | End: 2023-11-30
Payer: MEDICARE

## 2023-11-28 DIAGNOSIS — M54.50 LOW BACK PAIN, UNSPECIFIED BACK PAIN LATERALITY, UNSPECIFIED CHRONICITY, UNSPECIFIED WHETHER SCIATICA PRESENT: ICD-10-CM

## 2023-11-28 LAB
BACTERIA URNS QL MICRO: NEGATIVE /HPF
BILIRUB UR QL STRIP: NEGATIVE
CLARITY UR: ABNORMAL
COLOR UR: YELLOW
EPI CELLS #/AREA URNS AUTO: ABNORMAL /HPF (ref 0–5)
GLUCOSE UR STRIP-MCNC: NEGATIVE MG/DL
HGB UR QL STRIP: ABNORMAL
HYALINE CASTS #/AREA URNS LPF: ABNORMAL /LPF (ref 0–5)
KETONES UR STRIP-MCNC: ABNORMAL MG/DL
LEUKOCYTE ESTERASE UR QL STRIP: NEGATIVE
MUCOUS THREADS URNS QL MICRO: PRESENT /LPF
NITRITE UR QL STRIP: NEGATIVE
PH UR STRIP: 6 [PH] (ref 5–9)
PROT UR STRIP-MCNC: NEGATIVE MG/DL
RBC #/AREA URNS AUTO: ABNORMAL /HPF (ref 0–5)
SP GR UR STRIP: 1.03 (ref 1–1.03)
UROBILINOGEN UR STRIP-ACNC: 0.2 E.U./DL
WBC #/AREA URNS AUTO: ABNORMAL /HPF (ref 0–5)

## 2023-11-28 PROCEDURE — 87086 URINE CULTURE/COLONY COUNT: CPT

## 2023-11-28 PROCEDURE — 72148 MRI LUMBAR SPINE W/O DYE: CPT

## 2023-11-28 PROCEDURE — 81001 URINALYSIS AUTO W/SCOPE: CPT

## 2023-11-29 LAB — BACTERIA UR CULT: NORMAL

## 2023-12-06 ENCOUNTER — OFFICE VISIT (OUTPATIENT)
Dept: ORTHOPEDIC SURGERY | Age: 48
End: 2023-12-06
Payer: MEDICARE

## 2023-12-06 VITALS — WEIGHT: 235 LBS | HEIGHT: 66 IN | BODY MASS INDEX: 37.77 KG/M2

## 2023-12-06 DIAGNOSIS — M43.16 SPONDYLOLISTHESIS AT L4-L5 LEVEL: Primary | ICD-10-CM

## 2023-12-06 PROCEDURE — G8417 CALC BMI ABV UP PARAM F/U: HCPCS | Performed by: ORTHOPAEDIC SURGERY

## 2023-12-06 PROCEDURE — 99213 OFFICE O/P EST LOW 20 MIN: CPT | Performed by: ORTHOPAEDIC SURGERY

## 2023-12-06 PROCEDURE — G8427 DOCREV CUR MEDS BY ELIG CLIN: HCPCS | Performed by: ORTHOPAEDIC SURGERY

## 2023-12-06 PROCEDURE — G8484 FLU IMMUNIZE NO ADMIN: HCPCS | Performed by: ORTHOPAEDIC SURGERY

## 2023-12-06 PROCEDURE — 4004F PT TOBACCO SCREEN RCVD TLK: CPT | Performed by: ORTHOPAEDIC SURGERY

## 2023-12-06 NOTE — PROGRESS NOTES
Subjective:      Patient ID: Ashanti Diez is a 51 y.o. female who presents today for:  Chief Complaint   Patient presents with    Follow-up     Patient presents today for follow-up for MRI Lumbar spine. Patient states she        Subjective/Objective/Assessment/Plan:     SUBJECTIVE -the patient is here with complaints of low back pain. She does not describe symptoms into her legs on her pain diagram.  She has had issues with bladder control. She states that at times she will look down and see that she has lost her bladder. However most of the time she can sense when she is needing to urinate and can get to the bathroom. She is sent to me to evaluate her lumbar spine    OBJECTIVE -neurovascular intact bilateral lower extremities  MRI LUMBAR SPINE WO CONTRAST  Narrative: EXAMINATION:  MRI OF THE LUMBAR SPINE WITHOUT CONTRAST, 11/28/2023 10:38 am    TECHNIQUE:  Multiplanar multisequence MRI of the lumbar spine was performed without the  administration of intravenous contrast.    COMPARISON:  None. HISTORY:  ORDERING SYSTEM PROVIDED HISTORY: Low back pain, unspecified back pain  laterality, unspecified chronicity, unspecified whether sciatica present  TECHNOLOGIST PROVIDED HISTORY:  What reading provider will be dictating this exam?->CRC    FINDINGS:  BONES/ALIGNMENT: There is normal alignment of the spine. The vertebral body  heights are maintained. The bone marrow signal appears unremarkable. SPINAL CORD: The conus terminates normally. SOFT TISSUES: No paraspinal mass identified. L1-L2: There is no significant disc herniation, spinal canal stenosis or  neural foraminal narrowing. L2-L3: Mild disc desiccation with minimal disc bulge. No significant central  canal, lateral recess or neural foraminal stenoses. L3-L4: No significant disc herniation. Mild facet hypertrophy. No  significant central canal, lateral recess or neural foraminal stenoses.     L4-L5: Moderate loss of disc height with a disc

## 2024-01-16 ENCOUNTER — OFFICE VISIT (OUTPATIENT)
Dept: UROLOGY | Age: 49
End: 2024-01-16
Payer: MEDICARE

## 2024-01-16 VITALS
WEIGHT: 230 LBS | BODY MASS INDEX: 36.96 KG/M2 | HEIGHT: 66 IN | SYSTOLIC BLOOD PRESSURE: 112 MMHG | DIASTOLIC BLOOD PRESSURE: 76 MMHG | HEART RATE: 90 BPM | OXYGEN SATURATION: 97 %

## 2024-01-16 DIAGNOSIS — R39.15 URINARY URGENCY: Primary | ICD-10-CM

## 2024-01-16 DIAGNOSIS — N39.41 URGE INCONTINENCE: ICD-10-CM

## 2024-01-16 PROCEDURE — 4004F PT TOBACCO SCREEN RCVD TLK: CPT | Performed by: PHYSICIAN ASSISTANT

## 2024-01-16 PROCEDURE — G8484 FLU IMMUNIZE NO ADMIN: HCPCS | Performed by: PHYSICIAN ASSISTANT

## 2024-01-16 PROCEDURE — 99203 OFFICE O/P NEW LOW 30 MIN: CPT | Performed by: PHYSICIAN ASSISTANT

## 2024-01-16 PROCEDURE — G8427 DOCREV CUR MEDS BY ELIG CLIN: HCPCS | Performed by: PHYSICIAN ASSISTANT

## 2024-01-16 PROCEDURE — G8417 CALC BMI ABV UP PARAM F/U: HCPCS | Performed by: PHYSICIAN ASSISTANT

## 2024-01-16 ASSESSMENT — ENCOUNTER SYMPTOMS: APNEA: 0

## 2024-01-16 NOTE — PROGRESS NOTES
Subjective:      Patient ID: Tenzin Flores is a 48 y.o. female    HPI 48 year old female who presents for urinary urgency and urge incontinence. She states that as soon as she feel an urge she starts to have leakage. She denies urinary frequency. Nocturia 1-2 times per night. She admits to varying amounts of leakage from large to small. Symptoms have been present for the past 5 months. Denies gross hematuria and dysuria.     Past Medical History:   Diagnosis Date    Abnormal EKG 2019    Anemia     Chronic back pain     Family history of coronary artery disease 2019    History of tobacco abuse 2019    Hypotension     Morbid obesity (HCC) 2019    Osteoarthritis      Past Surgical History:   Procedure Laterality Date     SECTION      CYST REMOVAL  2015    FACE LEFT SIDE    HYSTERECTOMY, TOTAL ABDOMINAL (CERVIX REMOVED)      RAAD/ one ovary remains    TOTAL HIP ARTHROPLASTY Right 2023     Social History     Socioeconomic History    Marital status:      Spouse name: None    Number of children: None    Years of education: None    Highest education level: None   Tobacco Use    Smoking status: Former     Current packs/day: 0.00     Average packs/day: 0.5 packs/day for 14.0 years (7.0 ttl pk-yrs)     Types: Cigarettes     Start date: 10/2004     Quit date: 10/2018     Years since quittin.2    Smokeless tobacco: Current     Types: Snuff, Chew   Vaping Use    Vaping Use: Never used   Substance and Sexual Activity    Alcohol use: Yes     Alcohol/week: 0.0 standard drinks of alcohol     Comment: RARELY    Drug use: No    Sexual activity: Not Currently     Family History   Adopted: Yes     Current Outpatient Medications   Medication Sig Dispense Refill    oxyCODONE-acetaminophen (PERCOCET)  MG per tablet Take 1 tablet by mouth every 6 hours as needed for Pain for up to 30 days. Intended supply: 30 days Max Daily Amount: 4 tablets 120 tablet 0    meloxicam (MOBIC) 15 MG tablet TAKE

## 2024-01-17 DIAGNOSIS — G89.4 CHRONIC PAIN SYNDROME: ICD-10-CM

## 2024-01-17 RX ORDER — OXYCODONE AND ACETAMINOPHEN 10; 325 MG/1; MG/1
1 TABLET ORAL EVERY 6 HOURS PRN
Qty: 120 TABLET | Refills: 0 | Status: SHIPPED | OUTPATIENT
Start: 2024-01-17 | End: 2024-02-16

## 2024-01-17 NOTE — TELEPHONE ENCOUNTER
Requested Prescriptions     Pending Prescriptions Disp Refills    oxyCODONE-acetaminophen (PERCOCET)  MG per tablet 120 tablet 0     Sig: Take 1 tablet by mouth every 6 hours as needed for Pain for up to 30 days. Intended supply: 30 days Max Daily Amount: 4 tablets       Patient last seen on:  9/25/23    Date of last refill:  12/18/23    Reason for request:  pt requesting- out today     Next office visit date: 1/25/2024    Red dye, Metronidazole, and Vitamin b12

## 2024-01-25 ENCOUNTER — OFFICE VISIT (OUTPATIENT)
Dept: PAIN MANAGEMENT | Age: 49
End: 2024-01-25
Payer: MEDICARE

## 2024-01-25 VITALS
BODY MASS INDEX: 36.96 KG/M2 | HEIGHT: 66 IN | SYSTOLIC BLOOD PRESSURE: 134 MMHG | DIASTOLIC BLOOD PRESSURE: 72 MMHG | TEMPERATURE: 97 F | WEIGHT: 230 LBS

## 2024-01-25 DIAGNOSIS — G89.4 CHRONIC PAIN SYNDROME: Primary | ICD-10-CM

## 2024-01-25 DIAGNOSIS — M51.36 DDD (DEGENERATIVE DISC DISEASE), LUMBAR: ICD-10-CM

## 2024-01-25 DIAGNOSIS — M47.817 LUMBOSACRAL SPONDYLOSIS WITHOUT MYELOPATHY: ICD-10-CM

## 2024-01-25 DIAGNOSIS — M46.1 SACROILIITIS, NOT ELSEWHERE CLASSIFIED (HCC): ICD-10-CM

## 2024-01-25 DIAGNOSIS — M16.11 PRIMARY OSTEOARTHRITIS OF RIGHT HIP: ICD-10-CM

## 2024-01-25 PROCEDURE — G8417 CALC BMI ABV UP PARAM F/U: HCPCS | Performed by: PAIN MEDICINE

## 2024-01-25 PROCEDURE — 99214 OFFICE O/P EST MOD 30 MIN: CPT | Performed by: PAIN MEDICINE

## 2024-01-25 PROCEDURE — G8484 FLU IMMUNIZE NO ADMIN: HCPCS | Performed by: PAIN MEDICINE

## 2024-01-25 PROCEDURE — G8427 DOCREV CUR MEDS BY ELIG CLIN: HCPCS | Performed by: PAIN MEDICINE

## 2024-01-25 PROCEDURE — 4004F PT TOBACCO SCREEN RCVD TLK: CPT | Performed by: PAIN MEDICINE

## 2024-01-25 NOTE — PROGRESS NOTES
[facility]  [facility]  [address], [address]  [place], [place]  P: [phone]  F: [phone]        Tenzin Flores  (1975)    1/25/2024    Subjective:     Tenzin Flores is 48 y.o. female who complains today of:     Chief Complaint   Patient presents with    Follow-up    Back Pain     Lower    Hip Pain     Right     Patient here today for follow-up.  She did see Dr. Koehler from orthopedics and reviewed his notes.  Patient does not want undergo a lumbar fusion.  Most of her pain is across the back she is fairly stable she able to function fairly well in terms of standing walking.  She is recovering from her right hip replacement with Dr. Paulino from the summer 2023.  She wants to lose weight get in shape do some physical therapy.  She has chronic pain gets lumbar radiofrequency ablations and interventional spine procedures periodically.  She is on Percocet for chronic pain.  Helps her function do her activity living, chores around the house.  She is having some bladder issues she is getting checked out.  OARRS was reviewed.  No side effects or aberrant behavior the medication.  Helps her quality life.  Achy sharp pain that varies.    Plan: We had long detail discussion.  Patient is going to try healthy eating which she already does more physical therapy exercising on her own as well.  Will send her to physical therapy lumbar spine for strengthening stretching home excise program.  She will call in 3 weeks when she needs her Percocet.  OARRS was reviewed.  Narcotic drug policy reviewed.  Follow-up in 7 weeks.  She was also interested in getting a new primary care provider.  I also told her to inquire about Ozempic.  Questions answered chart was reviewed.  She is in agreement with plan.    Allergies:  Red dye, Metronidazole, and Vitamin b12    Past Medical History:   Diagnosis Date    Abnormal EKG 8/1/2019    Anemia     Chronic back pain

## 2024-01-31 ENCOUNTER — OFFICE VISIT (OUTPATIENT)
Dept: OBGYN CLINIC | Age: 49
End: 2024-01-31
Payer: MEDICARE

## 2024-01-31 VITALS
DIASTOLIC BLOOD PRESSURE: 70 MMHG | HEART RATE: 92 BPM | HEIGHT: 66 IN | WEIGHT: 230 LBS | SYSTOLIC BLOOD PRESSURE: 102 MMHG | BODY MASS INDEX: 36.96 KG/M2

## 2024-01-31 DIAGNOSIS — N39.41 URGE INCONTINENCE: ICD-10-CM

## 2024-01-31 DIAGNOSIS — N39.41 URGE INCONTINENCE: Primary | ICD-10-CM

## 2024-01-31 LAB
BILIRUB UR QL STRIP: NEGATIVE
CLARITY UR: CLEAR
COLOR UR: YELLOW
GLUCOSE UR STRIP-MCNC: NEGATIVE MG/DL
HGB UR QL STRIP: NEGATIVE
KETONES UR STRIP-MCNC: ABNORMAL MG/DL
LEUKOCYTE ESTERASE UR QL STRIP: NEGATIVE
NITRITE UR QL STRIP: NEGATIVE
PH UR STRIP: 5.5 [PH] (ref 5–9)
PROT UR STRIP-MCNC: NEGATIVE MG/DL
SP GR UR STRIP: 1.02 (ref 1–1.03)
UROBILINOGEN UR STRIP-ACNC: 0.2 E.U./DL

## 2024-01-31 PROCEDURE — 99213 OFFICE O/P EST LOW 20 MIN: CPT | Performed by: OBSTETRICS & GYNECOLOGY

## 2024-01-31 PROCEDURE — G8484 FLU IMMUNIZE NO ADMIN: HCPCS | Performed by: OBSTETRICS & GYNECOLOGY

## 2024-01-31 PROCEDURE — G8417 CALC BMI ABV UP PARAM F/U: HCPCS | Performed by: OBSTETRICS & GYNECOLOGY

## 2024-01-31 PROCEDURE — G8427 DOCREV CUR MEDS BY ELIG CLIN: HCPCS | Performed by: OBSTETRICS & GYNECOLOGY

## 2024-01-31 PROCEDURE — 4004F PT TOBACCO SCREEN RCVD TLK: CPT | Performed by: OBSTETRICS & GYNECOLOGY

## 2024-01-31 RX ORDER — TROSPIUM CHLORIDE ER 60 MG/1
60 CAPSULE ORAL DAILY
Qty: 14 CAPSULE | Refills: 0 | Status: SHIPPED | OUTPATIENT
Start: 2024-01-31

## 2024-01-31 NOTE — PROGRESS NOTES
Tenzin Flores is a 48 y.o. female who presents here today for complaints of Incontinence (Urge. Referred by Flavio Reis.)    Urinary urgency, frequency, incontinence 2-3 times during the day , nocturia 3-4 times , symptoms for the past year and worsening . Patient denies any significant leakage with coughing , laughing or sneezing.   .      Vitals:  /70 (Site: Left Upper Arm, Position: Sitting, Cuff Size: Large Adult)   Pulse 92   Ht 1.676 m (5' 6\")   Wt 104.3 kg (230 lb)   BMI 37.12 kg/m²   Allergies:  Red dye, Metronidazole, and Vitamin b12  Past Medical History:   Diagnosis Date    Abnormal EKG 2019    Anemia     Chronic back pain     Family history of coronary artery disease 2019    History of tobacco abuse 2019    Hypotension     Morbid obesity (HCC) 2019    Osteoarthritis      Past Surgical History:   Procedure Laterality Date     SECTION      CYST REMOVAL  2015    FACE LEFT SIDE    HYSTERECTOMY, TOTAL ABDOMINAL (CERVIX REMOVED)      RAAD/ one ovary remains    TOTAL HIP ARTHROPLASTY Right 2023     OB History          5    Para   4    Term   2       2    AB   1    Living   4         SAB   1    IAB        Ectopic        Molar        Multiple        Live Births   4              Family History   Adopted: Yes     Social History     Socioeconomic History    Marital status:      Spouse name: Not on file    Number of children: Not on file    Years of education: Not on file    Highest education level: Not on file   Occupational History    Not on file   Tobacco Use    Smoking status: Former     Current packs/day: 0.00     Average packs/day: 0.5 packs/day for 14.0 years (7.0 ttl pk-yrs)     Types: Cigarettes     Start date: 10/2004     Quit date: 10/2018     Years since quittin.3    Smokeless tobacco: Current     Types: Snuff, Chew   Vaping Use    Vaping Use: Never used   Substance and Sexual Activity    Alcohol use: Yes     Alcohol/week:

## 2024-02-01 LAB — BACTERIA UR CULT: NORMAL

## 2024-02-05 ENCOUNTER — PATIENT MESSAGE (OUTPATIENT)
Dept: OBGYN CLINIC | Age: 49
End: 2024-02-05

## 2024-02-08 ENCOUNTER — HOSPITAL ENCOUNTER (OUTPATIENT)
Dept: PHYSICAL THERAPY | Age: 49
Setting detail: THERAPIES SERIES
Discharge: HOME | End: 2024-02-08
Payer: MEDICARE

## 2024-02-08 PROCEDURE — 97162 PT EVAL MOD COMPLEX 30 MIN: CPT

## 2024-02-08 PROCEDURE — 97110 THERAPEUTIC EXERCISES: CPT

## 2024-02-08 ASSESSMENT — PAIN DESCRIPTION - ORIENTATION: ORIENTATION: RIGHT;LOWER

## 2024-02-08 ASSESSMENT — PAIN DESCRIPTION - LOCATION: LOCATION: BACK;HIP

## 2024-02-08 ASSESSMENT — PAIN SCALES - GENERAL: PAINLEVEL_OUTOF10: 7

## 2024-02-08 ASSESSMENT — PAIN DESCRIPTION - DESCRIPTORS: DESCRIPTORS: ACHING

## 2024-02-08 ASSESSMENT — PAIN DESCRIPTION - PAIN TYPE: TYPE: CHRONIC PAIN

## 2024-02-08 NOTE — THERAPY EVALUATION
Sensation  Overall Sensation Status: Impaired  Additional Comments: reports intermittent numbness in LEs    Left PROM  Right PROM         PROM LLE (degrees)  L SLR: 40 deg    PROM RLE (degrees)  R SLR: 35 deg        Left Strength  Right Strength         Strength LLE  L Hip Flexion: 4/5  L Hip ABduction: 4/5  L Knee Flexion: 3+/5  L Knee Extension: 4/5  L Ankle Dorsiflexion: 4+/5    Strength RLE  R Hip Flexion: 4/5  R Hip ABduction: 4/5  R Knee Flexion: 3+/5  R Knee Extension: 4/5  R Ankle Dorsiflexion: 4+/5     Lumbar Assessment     AROM Lumbar Spine   Lumbar spine general AROM: flexion seated 75% WFLs, rotation WFLs        Trunk Strength     Trunk Strength  Upper abdominals: Fair  Lower abdominals: Fair     Outcomes Score:  Exam: Modified Oswestry: 22/50         Treatment:    Exercises:   Exercises  Exercise 1: use wedge: LTR 5 sec hold x 10  Exercise 2: TA isometric 5 sec hold x 10  Exercise 3: bridges 5 sec hold x 5  Exercise 4: hamstring stretch*  Exercise 5: TA march*  Exercise 6: TA march with alternating UE*  Exercise 7: H/L hip adduction with ball, hip abduction with band*  Exercise 8: TA walkouts*  Exercise 9: piriformis stretch*  Exercise 10: SLR with TA*  Exercise 11: s/l hip abduction*  Exercise 12: clamshells*  Exercise 20: HEP: LTR, TA isometric, bridge     Modalities:Modalities: Yes  Moist Heat (CPT 71477)  Number Minutes Moist Heat: *  Electric stimulation, unattended (CPT 35759) /  (Medicare)  E-stim specified location: *     Manual:  Manual Therapy  Soft Tissue Mobilizaton: STM lumbar/thoracic*     *Indicates exercise,modality, or manual techniques to be initiated when appropriate       ASSESSMENT     Impression: Assessment: Patient reports chronic low back pain with reports of limited ambulation and function due to pain.  Upon PT evaluation, patient demonstrates decreased ROM in hamstrings and back with impaired core/LE strength.  Further PT recommended to improve ROM, strength, and decrease 
Detail Level: Zone
Patient educated will alternate with doing chemical peels on back and face. Next visit will do chemical peels on face.

## 2024-02-08 NOTE — PLAN OF CARE
Intervention: Decreased functional mobility , Decreased ROM, Decreased strength, Decreased endurance, Increased pain  Assessment: Patient reports chronic low back pain with reports of limited ambulation and function due to pain.  Upon PT evaluation, patient demonstrates decreased ROM in hamstrings and back with impaired core/LE strength.  Further PT recommended to improve ROM, strength, and decrease pain for overall quality of life.  Therapy Prognosis: Good      PT Education: Goals;PT Role;Plan of Care;Evaluative findings;Home Exercise Program    PLAN: [x] Evaluate and Treat  Frequency/Duration:  Plan Frequency: 2  Plan weeks: 4  Current Treatment Recommendations: Strengthening, ROM, Endurance training, Neuromuscular re-education, Manual, Home exercise program, Safety education & training, Patient/Caregiver education & training, Equipment evaluation, education, & procurement, Gait training, Functional mobility training, Stair training, Modalities  Modalities: Heat/Cold, E-stim - unattended     Precautions: NA                           Patient Status:[x] Continue/ Initiate plan of Care     [] Discharge PT.  Recommend pt continue with HEP.      [] Additional visits requested, Please re-certify for additional visits:     [] Hold           Signature: Electronically signed by Eneida Contreras PT on 2/8/2024 at 10:31 AM      If you have any questions or concerns, please don't hesitate to call.  Thank you for your referral.    I have reviewed this plan of care and certify a need for medically necessary rehabilitation services.    Physician Signature:__________________________________________________________  Date:  Please sign and return

## 2024-02-13 ENCOUNTER — TELEPHONE (OUTPATIENT)
Dept: OBGYN CLINIC | Age: 49
End: 2024-02-13

## 2024-02-13 NOTE — TELEPHONE ENCOUNTER
----- Message from Tenzin Flores sent at 2/13/2024  9:22 AM EST -----  Regarding: Medicine   Contact: [phone]  I [employer] and Medicare . The insurance will not cover the prescription Semaj told me 82$ for 14 pills is ridiculous! This has been the issue for 2 weeks now

## 2024-02-14 DIAGNOSIS — G89.4 CHRONIC PAIN SYNDROME: ICD-10-CM

## 2024-02-14 RX ORDER — OXYCODONE AND ACETAMINOPHEN 10; 325 MG/1; MG/1
1 TABLET ORAL EVERY 6 HOURS PRN
Qty: 120 TABLET | Refills: 0 | Status: SHIPPED | OUTPATIENT
Start: 2024-02-14 | End: 2024-03-15

## 2024-02-14 NOTE — TELEPHONE ENCOUNTER
Requested Prescriptions     Pending Prescriptions Disp Refills    oxyCODONE-acetaminophen (PERCOCET)  MG per tablet 120 tablet 0     Sig: Take 1 tablet by mouth every 6 hours as needed for Pain for up to 30 days. Intended supply: 30 days Max Daily Amount: 4 tablets       Patient last seen on:  1/25/24    Date of last refill:  1/17/24    Next office visit date: 3/14/2024    Red dye, Metronidazole, and Vitamin b12

## 2024-02-15 ENCOUNTER — HOSPITAL ENCOUNTER (OUTPATIENT)
Dept: PHYSICAL THERAPY | Age: 49
Setting detail: THERAPIES SERIES
Discharge: HOME | End: 2024-02-15
Payer: MEDICARE

## 2024-02-15 PROCEDURE — G0283 ELEC STIM OTHER THAN WOUND: HCPCS

## 2024-02-15 PROCEDURE — 97110 THERAPEUTIC EXERCISES: CPT

## 2024-02-15 RX ORDER — DARIFENACIN HYDROBROMIDE 15 MG/1
15 TABLET, EXTENDED RELEASE ORAL DAILY
Qty: 15 TABLET | Refills: 0 | Status: SHIPPED | OUTPATIENT
Start: 2024-02-15 | End: 2025-02-14

## 2024-02-15 NOTE — TELEPHONE ENCOUNTER
Different medication that could be covered , sent to pharmacy . Advise patient to let us know if was able to purchase new meds.

## 2024-02-15 NOTE — PROGRESS NOTES
[address]   [place] [address]  Phone:[phone]      Physical TherapyTreatment Note        Date: 2/15/2024  Patient: Tenzin Flores  : 1975   Confirmed: Yes  MRN: [id number]  Referring Provider: Patrick Coulter DO      Medical Diagnosis: Lumbosacral spondylosis without myelopathy [M47.817]      Treatment Diagnosis: impaired LE strength, impaired gait, impaired SLR    Visit Information:  Insurance: Payor: MEDICARE / Plan: MEDICARE PART A AND B / Product Type: *No Product type* /   PT Visit Information  PT Insurance Information: Medicare  Total # of Visits Approved:  (BMN)  Total # of Visits to Date: 2  Plan of Care/Certification Expiration Date: 24  No Show: 0  Progress Note Due Date: 24  Canceled Appointment: 0  Progress Note Counter:     Subjective Information:  Subjective: Patient reports she was sore after therapy last week.  HEP Compliance:  [x] Good [] Fair [] Poor [] Reports not doing due to:    Pain Screening  Patient Currently in Pain: Yes  Pain Assessment: 0-10  Pain Level: 8  Pain Location: Back, Hip  Pain Orientation: Right, Lower  Pain Descriptors: Aching    Treatment:  Exercises:  Exercises  Exercise 1: use wedge: LTR 5 sec hold x 10  Exercise 2: TA isometric 5 sec hold x 10  Exercise 3: bridges 5 sec hold x 10  Exercise 4: seated hamstring stretch 30 sec hold x 3  Exercise 5: TA march x 10  Exercise 6: TA march with alternating UE x 10  Exercise 7: H/L hip adduction 5 sec hold x 10, hip abduction RTB 5 sec hold x 10  Exercise 9: supine piriformis stretch 20 sec hold x 3 (gentle)  Exercise 20: HEP: TA march, TA march with alternate shoulder flexion, hip abduction with band, hip adduction with ball, hamstring stretch     Modalities:  Moist Heat (CPT 82871)  Patient Position: Seated  Number Minutes Moist Heat: 10 minutes with estim  Moist heat location: Low back  Moist heat specified location: low back  Post treatment skin assessment: Intact  Electric

## 2024-02-20 ENCOUNTER — HOSPITAL ENCOUNTER (OUTPATIENT)
Dept: PHYSICAL THERAPY | Age: 49
Setting detail: THERAPIES SERIES
Discharge: HOME | End: 2024-02-20
Payer: MEDICARE

## 2024-02-20 NOTE — PROGRESS NOTES
Therapy                            Cancellation/No-show Note    Date: 2024  Patient: Tenzin Flores (48 y.o. female)  : 1975  MRN:  [id number]  Referring Physician: Patrick Coulter DO    Medical Diagnosis: Lumbosacral spondylosis without myelopathy [M47.817]      Visit Information:  Insurance: Payor: MEDICARE / Plan: MEDICARE PART A AND B / Product Type: *No Product type* /   Visits to Date: 2   No Show/Cancelled Appts: 0 /       For today's appointment patient:  [x]  Cancelled  []  Rescheduled appointment  []  No-show   [x]  Called pt to remind of next appointment     Reason given by patient:  []  Patient ill  []  Conflicting appointment  []  No transportation    []  Conflict with work  []  No reason given  [x]  Other: too sore      [x] Pt has future appointments scheduled, no follow up needed  [] Pt requests to be on hold.    Reason:   If > 2 weeks please discuss with therapist.  [] Therapist to call pt for follow up     Comments:       Signature: Electronically signed by Kacie Castro PTA on 24 at 12:51 PM EST

## 2024-02-22 ENCOUNTER — HOSPITAL ENCOUNTER (OUTPATIENT)
Dept: PHYSICAL THERAPY | Age: 49
Setting detail: THERAPIES SERIES
Discharge: HOME | End: 2024-02-22
Payer: MEDICARE

## 2024-02-22 ENCOUNTER — PROCEDURE VISIT (OUTPATIENT)
Dept: PAIN MANAGEMENT | Age: 49
End: 2024-02-22
Payer: MEDICARE

## 2024-02-22 VITALS
HEIGHT: 66 IN | DIASTOLIC BLOOD PRESSURE: 80 MMHG | SYSTOLIC BLOOD PRESSURE: 118 MMHG | BODY MASS INDEX: 3.21 KG/M2 | WEIGHT: 20 LBS

## 2024-02-22 DIAGNOSIS — G89.4 CHRONIC PAIN SYNDROME: ICD-10-CM

## 2024-02-22 DIAGNOSIS — M16.11 PRIMARY OSTEOARTHRITIS OF RIGHT HIP: ICD-10-CM

## 2024-02-22 DIAGNOSIS — M47.817 LUMBOSACRAL SPONDYLOSIS WITHOUT MYELOPATHY: Primary | ICD-10-CM

## 2024-02-22 DIAGNOSIS — M51.36 DDD (DEGENERATIVE DISC DISEASE), LUMBAR: ICD-10-CM

## 2024-02-22 DIAGNOSIS — M46.1 SACROILIITIS, NOT ELSEWHERE CLASSIFIED (HCC): ICD-10-CM

## 2024-02-22 PROCEDURE — 99213 OFFICE O/P EST LOW 20 MIN: CPT | Performed by: PAIN MEDICINE

## 2024-02-22 PROCEDURE — 27096 INJECT SACROILIAC JOINT: CPT | Performed by: PAIN MEDICINE

## 2024-02-22 RX ORDER — BETAMETHASONE SODIUM PHOSPHATE AND BETAMETHASONE ACETATE 3; 3 MG/ML; MG/ML
6 INJECTION, SUSPENSION INTRA-ARTICULAR; INTRALESIONAL; INTRAMUSCULAR; SOFT TISSUE ONCE
Status: COMPLETED | OUTPATIENT
Start: 2024-02-22 | End: 2024-02-22

## 2024-02-22 RX ORDER — LIDOCAINE HYDROCHLORIDE 10 MG/ML
3 INJECTION, SOLUTION EPIDURAL; INFILTRATION; INTRACAUDAL; PERINEURAL ONCE
Status: COMPLETED | OUTPATIENT
Start: 2024-02-22 | End: 2024-02-22

## 2024-02-22 RX ADMIN — BETAMETHASONE SODIUM PHOSPHATE AND BETAMETHASONE ACETATE 6 MG: 3; 3 INJECTION, SUSPENSION INTRA-ARTICULAR; INTRALESIONAL; INTRAMUSCULAR; SOFT TISSUE at 12:57

## 2024-02-22 RX ADMIN — LIDOCAINE HYDROCHLORIDE 3 ML: 10 INJECTION, SOLUTION EPIDURAL; INFILTRATION; INTRACAUDAL; PERINEURAL at 12:57

## 2024-02-22 NOTE — PROGRESS NOTES
Therapy                            Cancellation/No-show Note    Date: 2024  Patient: Tenzin Flores (48 y.o. female)  : 1975  MRN:  [id number]  Referring Physician: Patrick Coulter DO    Medical Diagnosis: Lumbosacral spondylosis without myelopathy [M47.817]      Visit Information:  Insurance: Payor: MEDICARE / Plan: MEDICARE PART A AND B / Product Type: *No Product type* /   Visits to Date: 2   No Show/Cancelled Appts: 0       For today's appointment patient:  [x]  Cancelled  []  Rescheduled appointment  []  No-show   []  Called pt to remind of next appointment     Reason given by patient:  []  Patient ill  []  Conflicting appointment  []  No transportation    []  Conflict with work  []  No reason given  [x]  Other: not feeling well     [] Pt has future appointments scheduled, no follow up needed  [] Pt requests to be on hold.    Reason:   If > 2 weeks please discuss with therapist.  [] Therapist to call pt for follow up     Comments:       Signature: Electronically signed by Eneida Contreras PT on 24 at 10:11 AM EST

## 2024-02-22 NOTE — PROGRESS NOTES
[facility]  Neurosurgery and Pain Management Center  [address], [address]  [place], [place]  P: [phone]  F: [phone]      Patient Name: Tenzin Flores  : 1975     Date:  2024      Physician: FRANK HALL DO        Tenzin Flores is here today for interventional pain management.    Preoperatively, the patient presents with SI joint mediated pain, as per history and exam. Patient has failed NSAIDs, PT, and conservative treatment. Patient has significant psychological and functional impairment due to this condition.  Standard ASI guidelines were followed and sterile technique used.  Area was cleaned with Betadine x3.  Informed consent was obtained.  Fluoroscopic guidance was used for this procedure.    S.I. JOINT:  Bilateral  Appropriate length spinal needle was advanced to the S.I. Joint.  Negative aspiration was achieved. In total, approximately 6mg of Betamethasone and 2ml of 1% preservative free Lidocaine was injected without difficulty.    Patient tolerated the procedure well, no obvious complications occurred during the procedure.  Patient was appropriately monitored and discharged home in stable condition with their usual motor strength. Post Op Instructions were given to patient. Relevant and recent imaging reviewed with patient today.                                      FRANK HALL DO                                              
Last Rate Last Admin    iopamidol (ISOVUE-370) 76 % injection 1 mL  1 mL Other ONCE PRN Patrick Coulter,         sodium chloride bacteriostatic 0.9 % injection 1 mL  1 mL Injection Once Patrick Coulter,                HPI    Review of Systems   All other systems reviewed and are negative.        Objective:     Vitals:  /80   Ht 1.676 m (5' 6\")   Wt 9.072 kg (20 lb)   BMI 3.23 kg/m² Pain Score:  10 - Worst pain ever      Physical Exam  Patient alert and oriented times three, recent and remote memory intact, mood and affect normal, judgement and insight normal. Strength functional for ambulation. Balance and coordinational functional. Visualized skin intact. No visualized cyanosis, pulses intact. Cranial nerves 2-12 grossly intact. No obvious upper motor neuron signs seen. Sensation intact to light touch.Pain Over B S I joint with (+) provacative manuvers. SLR Negative.  SLR negative.   Tender along lumber facet joints with pain and decreased ROM.  Extension and rotation with muscle spasms.          Assessment:      Diagnosis Orders   1. Lumbosacral spondylosis without myelopathy        2. Sacroiliitis, not elsewhere classified (HCC)  betamethasone acetate-betamethasone sodium phosphate (CELESTONE) injection 6 mg    lidocaine PF 1 % injection 3 mL      3. DDD (degenerative disc disease), lumbar        4. Primary osteoarthritis of right hip        5. Chronic pain syndrome            Plan:

## 2024-02-27 ENCOUNTER — HOSPITAL ENCOUNTER (OUTPATIENT)
Dept: PHYSICAL THERAPY | Age: 49
Setting detail: THERAPIES SERIES
Discharge: HOME | End: 2024-02-27
Payer: MEDICARE

## 2024-02-27 NOTE — PROGRESS NOTES
Therapy                            Cancellation/No-show Note    Date: 2024  Patient: Tenzin Flores (48 y.o. female)  : 1975  MRN:  [id number]  Referring Physician: Patrick Coulter DO    Medical Diagnosis: Lumbosacral spondylosis without myelopathy [M47.817]      Visit Information:  Insurance: Payor: MEDICARE / Plan: MEDICARE PART A AND B / Product Type: *No Product type* /   Visits to Date: 2   No Show/Cancelled Appts: 0 / 3      For today's appointment patient:  [x]  Cancelled  []  Rescheduled appointment  []  No-show   []  Called pt to remind of next appointment     Reason given by patient:  []  Patient ill  []  Conflicting appointment  []  No transportation    []  Conflict with work  []  No reason given  [x]  Other: had injections      [x] Pt has future appointments scheduled, no follow up needed  [] Pt requests to be on hold.    Reason:   If > 2 weeks please discuss with therapist.  [] Therapist to call pt for follow up     Comments:       Signature: Electronically signed by Kacie Castro PTA on 24 at 8:28 AM EST

## 2024-02-29 ENCOUNTER — HOSPITAL ENCOUNTER (OUTPATIENT)
Dept: PHYSICAL THERAPY | Age: 49
Setting detail: THERAPIES SERIES
Discharge: HOME | End: 2024-02-29
Payer: MEDICARE

## 2024-02-29 NOTE — PROGRESS NOTES
Therapy                            Cancellation/No-show Note    Date: 2024  Patient: Tenzin Flores (48 y.o. female)  : 1975  MRN:  [id number]  Referring Physician: Patrick Coulter DO    Medical Diagnosis: Lumbosacral spondylosis without myelopathy [M47.817]      Visit Information:  Insurance: Payor: MEDICARE / Plan: MEDICARE PART A AND B / Product Type: *No Product type* /   Visits to Date: 2   No Show/Cancelled Appts: 0 / 4      For today's appointment patient:  [x]  Cancelled  []  Rescheduled appointment  []  No-show   []  Called pt to remind of next appointment     Reason given by patient:  []  Patient ill  []  Conflicting appointment  []  No transportation    []  Conflict with work  []  No reason given  [x]  Other:  had injections    [] Pt has future appointments scheduled, no follow up needed  [] Pt requests to be on hold.    Reason:   If > 2 weeks please discuss with therapist.  [] Therapist to call pt for follow up     Comments:       Signature: Electronically signed by Eneida Contreras PT on 24 at 10:09 AM EST

## 2024-03-13 DIAGNOSIS — G89.4 CHRONIC PAIN SYNDROME: ICD-10-CM

## 2024-03-13 RX ORDER — OXYCODONE AND ACETAMINOPHEN 10; 325 MG/1; MG/1
1 TABLET ORAL EVERY 6 HOURS PRN
Qty: 120 TABLET | Refills: 0 | Status: SHIPPED | OUTPATIENT
Start: 2024-03-13 | End: 2024-04-12

## 2024-03-13 NOTE — TELEPHONE ENCOUNTER
Requested Prescriptions     Pending Prescriptions Disp Refills    oxyCODONE-acetaminophen (PERCOCET)  MG per tablet 120 tablet 0     Sig: Take 1 tablet by mouth every 6 hours as needed for Pain for up to 30 days. Intended supply: 30 days Max Daily Amount: 4 tablets       Patient last seen on: 02/22/24    Date of last surgery: N/A    Date of last refill: 02/14/24    Reason for request: Dai requested     Request date for pharmacy pick-up: 03/13/24    Next office visit date: 3/26/2024    Red dye, Metronidazole, and Vitamin b12

## 2024-03-26 ENCOUNTER — OFFICE VISIT (OUTPATIENT)
Dept: PAIN MANAGEMENT | Age: 49
End: 2024-03-26
Payer: MEDICARE

## 2024-03-26 VITALS
TEMPERATURE: 96.7 F | HEIGHT: 66 IN | DIASTOLIC BLOOD PRESSURE: 84 MMHG | SYSTOLIC BLOOD PRESSURE: 122 MMHG | BODY MASS INDEX: 3.23 KG/M2

## 2024-03-26 DIAGNOSIS — M75.102 BILATERAL ROTATOR CUFF SYNDROME: ICD-10-CM

## 2024-03-26 DIAGNOSIS — M47.817 LUMBOSACRAL SPONDYLOSIS WITHOUT MYELOPATHY: Primary | ICD-10-CM

## 2024-03-26 DIAGNOSIS — M75.101 BILATERAL ROTATOR CUFF SYNDROME: ICD-10-CM

## 2024-03-26 PROCEDURE — 99213 OFFICE O/P EST LOW 20 MIN: CPT | Performed by: PAIN MEDICINE

## 2024-03-26 PROCEDURE — 64635 DESTROY LUMB/SAC FACET JNT: CPT | Performed by: PAIN MEDICINE

## 2024-03-26 PROCEDURE — 64636 DESTROY L/S FACET JNT ADDL: CPT | Performed by: PAIN MEDICINE

## 2024-03-26 RX ORDER — BETAMETHASONE SODIUM PHOSPHATE AND BETAMETHASONE ACETATE 3; 3 MG/ML; MG/ML
6 INJECTION, SUSPENSION INTRA-ARTICULAR; INTRALESIONAL; INTRAMUSCULAR; SOFT TISSUE ONCE
Status: COMPLETED | OUTPATIENT
Start: 2024-03-26 | End: 2024-03-26

## 2024-03-26 RX ORDER — LIDOCAINE HYDROCHLORIDE 10 MG/ML
3 INJECTION, SOLUTION EPIDURAL; INFILTRATION; INTRACAUDAL; PERINEURAL ONCE
Status: COMPLETED | OUTPATIENT
Start: 2024-03-26 | End: 2024-03-26

## 2024-03-26 RX ADMIN — LIDOCAINE HYDROCHLORIDE 3 ML: 10 INJECTION, SOLUTION EPIDURAL; INFILTRATION; INTRACAUDAL; PERINEURAL at 13:54

## 2024-03-26 RX ADMIN — BETAMETHASONE SODIUM PHOSPHATE AND BETAMETHASONE ACETATE 6 MG: 3; 3 INJECTION, SUSPENSION INTRA-ARTICULAR; INTRALESIONAL; INTRAMUSCULAR; SOFT TISSUE at 13:54

## 2024-03-26 NOTE — PROGRESS NOTES
[facility]  Neurosurgery and Pain Management Center  [address], [address]  [place], [place]  P: [phone]  F: [phone]        Tenzin Flores  (1975)    3/26/2024    Subjective:     Tenzin Flores is 48 y.o. female who complains today of:     Chief Complaint   Patient presents with    Back Pain     Patient here today for follow-up.  She has chronic pain low back both shoulders are bothering her recently left worse than right with raising the arms.  Some Percocet for chronic pain OARRS is reviewed.  No side effects or aberrant behavior.  Achy sharp pain she is really having trouble with her shoulders.  She has chronic low back pain worse with bending more time activity history of RF ablation.    Plan will get bilateral shoulder x-rays set up bilateral shoulder subacromial injections.  OARRS is reviewed narcotic drug policy reviewed.  She may also need rehab on her shoulders.  Questions answered chart was reviewed    Allergies:  Red dye, Metronidazole, and Vitamin b12    Past Medical History:   Diagnosis Date    Abnormal EKG 2019    Anemia     Chronic back pain     Family history of coronary artery disease 2019    History of tobacco abuse 2019    Hypotension     Morbid obesity (HCC) 2019    Osteoarthritis      Past Surgical History:   Procedure Laterality Date     SECTION      CYST REMOVAL  2015    FACE LEFT SIDE    HYSTERECTOMY, TOTAL ABDOMINAL (CERVIX REMOVED)      RAAD/ one ovary remains    TOTAL HIP ARTHROPLASTY Right 2023     Family History   Adopted: Yes     Social History     Socioeconomic History    Marital status:      Spouse name: Not on file    Number of children: Not on file    Years of education: Not on file    Highest education level: Not on file   Occupational History    Not on file   Tobacco Use    Smoking status: Former     Current packs/day: 0.00     Average packs/day: 0.5 packs/day for 14.0 years (7.0

## 2024-03-26 NOTE — PROGRESS NOTES
[facility]  Neurosurgery and Pain Management Center  [address], [address]  [place], [place]  P: [phone]  F: [phone]      Lumbar Radio Frequency Ablation     Provider: FRANK HALL DO          Patient Name: Tenzin Flores : 1975        Date: 3/26/2024      Tenzin Flores is here today for interventional pain management.  Standard ASI guidelines were followed and sterile technique used.  Area was cleaned with Betadine x3.  Informed consent was obtained.  Fluoroscopic guidance was used for this procedure. Multiple views of fluoroscopy were used during procedure to assist with needle placement. Appropriate sized RF 10mm active tip needle was used and advance to appropriate anatomic location.    There was appropriate multifidus contraction noted with motor stimulation at 2 Hz between 0.5-1.5 volts. No limb or gluteal contraction was noted taking it up to 3.5 volts. Prior to lesioning at 80 degrees Celsius for 90 seconds, approximately 0.75mg/1mg of Celestone and ½ cc of 1% preservative free Lidocaine was injected. Impedance was between 200-500 ohms during the procedure.     Patient tolerated the procedure well, no obvious complications occurred during the procedure.  Patient was appropriately monitored and discharged home in stable condition with their usual motor strength. Post Op instructions were given to patient.          [x] Bilateral [] T11 [] L1 [] S1     [] T12 [] L2 [] S2    [] Right  [x] L3 [] S3      [x] L4 [] S4    [] Left  [x] L5                              FRANK HALL DO

## 2024-03-28 DIAGNOSIS — M75.102 BILATERAL ROTATOR CUFF SYNDROME: ICD-10-CM

## 2024-03-28 DIAGNOSIS — M75.101 BILATERAL ROTATOR CUFF SYNDROME: ICD-10-CM

## 2024-03-28 PROCEDURE — 73030 X-RAY EXAM OF SHOULDER: CPT | Performed by: PAIN MEDICINE

## 2024-04-02 ENCOUNTER — PROCEDURE VISIT (OUTPATIENT)
Dept: PAIN MANAGEMENT | Age: 49
End: 2024-04-02
Payer: MEDICARE

## 2024-04-02 VITALS
HEIGHT: 66 IN | BODY MASS INDEX: 36.96 KG/M2 | WEIGHT: 230 LBS | DIASTOLIC BLOOD PRESSURE: 80 MMHG | SYSTOLIC BLOOD PRESSURE: 122 MMHG

## 2024-04-02 DIAGNOSIS — M46.1 SACROILIITIS, NOT ELSEWHERE CLASSIFIED (HCC): ICD-10-CM

## 2024-04-02 DIAGNOSIS — M75.102 BILATERAL ROTATOR CUFF SYNDROME: ICD-10-CM

## 2024-04-02 DIAGNOSIS — M47.817 LUMBOSACRAL SPONDYLOSIS WITHOUT MYELOPATHY: Primary | ICD-10-CM

## 2024-04-02 DIAGNOSIS — G89.4 CHRONIC PAIN SYNDROME: ICD-10-CM

## 2024-04-02 DIAGNOSIS — M51.36 DDD (DEGENERATIVE DISC DISEASE), LUMBAR: ICD-10-CM

## 2024-04-02 DIAGNOSIS — M75.101 BILATERAL ROTATOR CUFF SYNDROME: ICD-10-CM

## 2024-04-02 PROCEDURE — 99213 OFFICE O/P EST LOW 20 MIN: CPT | Performed by: PAIN MEDICINE

## 2024-04-02 PROCEDURE — 20610 DRAIN/INJ JOINT/BURSA W/O US: CPT | Performed by: PAIN MEDICINE

## 2024-04-02 PROCEDURE — 77002 NEEDLE LOCALIZATION BY XRAY: CPT | Performed by: PAIN MEDICINE

## 2024-04-02 RX ORDER — BETAMETHASONE SODIUM PHOSPHATE AND BETAMETHASONE ACETATE 3; 3 MG/ML; MG/ML
6 INJECTION, SUSPENSION INTRA-ARTICULAR; INTRALESIONAL; INTRAMUSCULAR; SOFT TISSUE ONCE
Status: COMPLETED | OUTPATIENT
Start: 2024-04-02 | End: 2024-04-02

## 2024-04-02 RX ORDER — LIDOCAINE HYDROCHLORIDE 10 MG/ML
3 INJECTION, SOLUTION EPIDURAL; INFILTRATION; INTRACAUDAL; PERINEURAL ONCE
Status: COMPLETED | OUTPATIENT
Start: 2024-04-02 | End: 2024-04-02

## 2024-04-02 RX ADMIN — LIDOCAINE HYDROCHLORIDE 3 ML: 10 INJECTION, SOLUTION EPIDURAL; INFILTRATION; INTRACAUDAL; PERINEURAL at 14:00

## 2024-04-02 RX ADMIN — BETAMETHASONE SODIUM PHOSPHATE AND BETAMETHASONE ACETATE 6 MG: 3; 3 INJECTION, SUSPENSION INTRA-ARTICULAR; INTRALESIONAL; INTRAMUSCULAR; SOFT TISSUE at 14:01

## 2024-04-02 NOTE — PROGRESS NOTES
[facility]  Neurosurgery and Pain Management Center  [address], [address]  [place], [place]  P: [phone]  F: [phone]          Tenzin Flores is here for Bilateral sub-acromial bursa injection(s). Written informed consent obtained and area was cleaned with betadine and fluoroscopic guidance was used. 27 gauge 1.5 inch needle was used and advanced to the sub-acromial space without difficulty. After negative aspiration, 6mg of Betamethasone along with 2ml of 1% PF Lidocaine was injected without difficulty. Patient tolerated procedure well, no obvious complications occurred and sterile techniques were used throughout the procedure.    Patrick Coulter D.O.

## 2024-04-02 NOTE — PROGRESS NOTES
[facility]  Neurosurgery and Pain Management Center  [address], [address]  [place], [place]  P: [phone]  F: [phone]        Tenzin Flores  (1975)    2024    Subjective:     Tenzin Flores is 48 y.o. female who complains today of:     Chief Complaint   Patient presents with    Shoulder Pain     PAULINE     Patient here today for follow-up.  She had a shoulder x-rays.  The right x-ray report says moderate joint arthritis the left shows possibly avascular necrosis with some degenerative changes.  We talked at length about risks and benefits of cortisone patient is a lot of pain worse on the left decreased range of motion both shoulders bother her worse on the left.  Low back is responding well to the RF ablation she has chronic pain on Percocet for chronic pain issues.    Plan we talked about side effects of steroids and avascular necrosis after discussion we decided to proceed with injections today as is not definitive that she has AVN left shoulder.  Will order left shoulder MRI.  If it does show significant changes and possibly avascular necrosis then she will need to see Dr. Jim Paulino.  She understands the plan and is in agreement questions answered chart was reviewed.  Risk benefits discussed.    Allergies:  Red dye, Metronidazole, and Vitamin b12    Past Medical History:   Diagnosis Date    Abnormal EKG 2019    Anemia     Chronic back pain     Family history of coronary artery disease 2019    History of tobacco abuse 2019    Hypotension     Morbid obesity (HCC) 2019    Osteoarthritis      Past Surgical History:   Procedure Laterality Date     SECTION      CYST REMOVAL  2015    FACE LEFT SIDE    HYSTERECTOMY, TOTAL ABDOMINAL (CERVIX REMOVED)      RAAD/ one ovary remains    TOTAL HIP ARTHROPLASTY Right 2023     Family History   Adopted: Yes     Social History     Socioeconomic History    Marital status:

## 2024-04-12 DIAGNOSIS — G89.4 CHRONIC PAIN SYNDROME: ICD-10-CM

## 2024-04-12 RX ORDER — OXYCODONE AND ACETAMINOPHEN 10; 325 MG/1; MG/1
1 TABLET ORAL EVERY 6 HOURS PRN
Qty: 120 TABLET | Refills: 0 | Status: SHIPPED | OUTPATIENT
Start: 2024-04-12 | End: 2024-05-12

## 2024-04-12 NOTE — TELEPHONE ENCOUNTER
Requested Prescriptions     Pending Prescriptions Disp Refills    oxyCODONE-acetaminophen (PERCOCET)  MG per tablet 120 tablet 0     Sig: Take 1 tablet by mouth every 6 hours as needed for Pain for up to 30 days. Intended supply: 30 days Max Daily Amount: 4 tablets       Patient last seen on:  4/2/24    Date of last surgery:  na    Date of last refill:  3/13/24    Reason for request:  patient requested refill via [employer]    Request date for pharmacy pick-up:  4/12/24    Next office visit date: 5/9/24    Red dye, Metronidazole, and Vitamin b12

## 2024-04-24 ENCOUNTER — HOSPITAL ENCOUNTER (OUTPATIENT)
Dept: MRI IMAGING | Age: 49
Discharge: HOME | End: 2024-04-26
Payer: MEDICARE

## 2024-04-24 DIAGNOSIS — M75.102 BILATERAL ROTATOR CUFF SYNDROME: ICD-10-CM

## 2024-04-24 DIAGNOSIS — M75.101 BILATERAL ROTATOR CUFF SYNDROME: ICD-10-CM

## 2024-04-24 PROCEDURE — 73221 MRI JOINT UPR EXTREM W/O DYE: CPT

## 2024-05-13 DIAGNOSIS — G89.4 CHRONIC PAIN SYNDROME: ICD-10-CM

## 2024-05-13 RX ORDER — OXYCODONE AND ACETAMINOPHEN 10; 325 MG/1; MG/1
1 TABLET ORAL EVERY 6 HOURS PRN
Qty: 72 TABLET | Refills: 0 | Status: SHIPPED | OUTPATIENT
Start: 2024-05-13 | End: 2024-05-31

## 2024-05-13 NOTE — TELEPHONE ENCOUNTER
Requested Prescriptions     Pending Prescriptions Disp Refills    oxyCODONE-acetaminophen (PERCOCET)  MG per tablet 72 tablet 0     Sig: Take 1 tablet by mouth every 6 hours as needed for Pain for up to 18 days. Intended supply: 30 days Max Daily Amount: 4 tablets       Patient last seen on:  4/2/24    Date of last surgery:  N?A    Date of last refill:  4/12/24    Reason for request:  out of meds before next appt     Request date for pharmacy pick-up:  5/13/24    Next office visit date: 5/30/2024    Red dye, Metronidazole, and Vitamin b12

## 2024-05-30 ENCOUNTER — OFFICE VISIT (OUTPATIENT)
Dept: PAIN MANAGEMENT | Age: 49
End: 2024-05-30
Payer: MEDICARE

## 2024-05-30 VITALS
BODY MASS INDEX: 35.31 KG/M2 | DIASTOLIC BLOOD PRESSURE: 80 MMHG | TEMPERATURE: 97.3 F | HEIGHT: 67 IN | WEIGHT: 225 LBS | SYSTOLIC BLOOD PRESSURE: 118 MMHG

## 2024-05-30 DIAGNOSIS — G89.4 CHRONIC PAIN SYNDROME: ICD-10-CM

## 2024-05-30 DIAGNOSIS — M46.1 SACROILIITIS, NOT ELSEWHERE CLASSIFIED (HCC): ICD-10-CM

## 2024-05-30 DIAGNOSIS — M47.817 LUMBOSACRAL SPONDYLOSIS WITHOUT MYELOPATHY: Primary | ICD-10-CM

## 2024-05-30 DIAGNOSIS — M19.012 ARTHRITIS OF LEFT SHOULDER REGION: ICD-10-CM

## 2024-05-30 DIAGNOSIS — M47.817 LUMBOSACRAL SPONDYLOSIS WITHOUT MYELOPATHY: ICD-10-CM

## 2024-05-30 PROCEDURE — 3017F COLORECTAL CA SCREEN DOC REV: CPT | Performed by: PAIN MEDICINE

## 2024-05-30 PROCEDURE — 99214 OFFICE O/P EST MOD 30 MIN: CPT | Performed by: PAIN MEDICINE

## 2024-05-30 PROCEDURE — G8417 CALC BMI ABV UP PARAM F/U: HCPCS | Performed by: PAIN MEDICINE

## 2024-05-30 PROCEDURE — 4004F PT TOBACCO SCREEN RCVD TLK: CPT | Performed by: PAIN MEDICINE

## 2024-05-30 PROCEDURE — G8427 DOCREV CUR MEDS BY ELIG CLIN: HCPCS | Performed by: PAIN MEDICINE

## 2024-05-30 RX ORDER — OXYCODONE AND ACETAMINOPHEN 10; 325 MG/1; MG/1
1 TABLET ORAL EVERY 6 HOURS PRN
Qty: 120 TABLET | Refills: 0 | Status: SHIPPED | OUTPATIENT
Start: 2024-05-30 | End: 2024-06-29

## 2024-05-30 NOTE — PROGRESS NOTES
[facility]  Neurosurgery and Pain Management Center  [address], [address]  [place], [place]  P: [phone]  F: [phone]        Tenzin Flores  (1975)    2024    Subjective:     Tenzin Flores is 49 y.o. female who complains today of:     Chief Complaint   Patient presents with    Back Pain    Shoulder Pain    Results     Review MRI Shoulder       Patient here today for follow-up.  She has chronic pain in her low back.  She has a lot of left shoulder pain.  She had the MRI left shoulder shows significant arthritis some rotator cuff issues.  She has decreased range of motion.  History of lumbar RF ablation.  She does not want or need back surgery at this time.  The Percocet helps her function, do basic activity living, chores around the house.  Sleeping is impaired due to pain in her right hip history of replacement low back, left shoulder.  Difficulty using her left arm.  She is trying to home exercises.  No side effects or aberrant behavior with the Percocet.    Plan: We discussed options in detail.  She will see Dr. Jim Paulino about her shoulder on the left.  Continue Percocet for chronic pain.  Hold off on injections.  Excise program as tolerated.  Follow-up in 1 to 2 months.  OARRS is reviewed narcotic drug policy reviewed.  Questions answered chart was reviewed.  No back injections needed at this time.  Allergies:  Red dye, Metronidazole, and Vitamin b12    Past Medical History:   Diagnosis Date    Abnormal EKG 2019    Anemia     Chronic back pain     Family history of coronary artery disease 2019    History of tobacco abuse 2019    Hypotension     Morbid obesity (HCC) 2019    Osteoarthritis      Past Surgical History:   Procedure Laterality Date     SECTION      CYST REMOVAL  2015    FACE LEFT SIDE    HYSTERECTOMY, TOTAL ABDOMINAL (CERVIX REMOVED)      RAAD/ one ovary remains    TOTAL HIP ARTHROPLASTY Right 2023

## 2024-05-31 RX ORDER — TIZANIDINE 4 MG/1
TABLET ORAL
Qty: 60 TABLET | Refills: 0 | Status: SHIPPED | OUTPATIENT
Start: 2024-05-31

## 2024-05-31 NOTE — TELEPHONE ENCOUNTER
Requested Prescriptions     Pending Prescriptions Disp Refills    tiZANidine (ZANAFLEX) 4 MG tablet 60 tablet 0       Patient last seen on:  5/30/24    Reason for request:  patient requesting     Next office visit date: Visit date not found    Red dye, Metronidazole, and Vitamin b12

## 2024-06-10 ENCOUNTER — APPOINTMENT (OUTPATIENT)
Dept: ORTHOPEDIC SURGERY | Facility: CLINIC | Age: 49
End: 2024-06-10
Payer: COMMERCIAL

## 2024-06-12 ENCOUNTER — APPOINTMENT (OUTPATIENT)
Dept: ORTHOPEDIC SURGERY | Facility: CLINIC | Age: 49
End: 2024-06-12
Payer: COMMERCIAL

## 2024-06-17 ENCOUNTER — HOSPITAL ENCOUNTER (OUTPATIENT)
Dept: RADIOLOGY | Facility: HOSPITAL | Age: 49
Discharge: HOME | End: 2024-06-17
Payer: COMMERCIAL

## 2024-06-17 ENCOUNTER — APPOINTMENT (OUTPATIENT)
Dept: ORTHOPEDIC SURGERY | Facility: CLINIC | Age: 49
End: 2024-06-17
Payer: COMMERCIAL

## 2024-06-17 DIAGNOSIS — M25.512 LEFT SHOULDER PAIN, UNSPECIFIED CHRONICITY: ICD-10-CM

## 2024-06-17 DIAGNOSIS — M19.019 GLENOHUMERAL ARTHRITIS: Primary | ICD-10-CM

## 2024-06-17 PROCEDURE — 73030 X-RAY EXAM OF SHOULDER: CPT | Mod: LEFT SIDE | Performed by: RADIOLOGY

## 2024-06-17 PROCEDURE — 99214 OFFICE O/P EST MOD 30 MIN: CPT | Performed by: ORTHOPAEDIC SURGERY

## 2024-06-17 PROCEDURE — 73030 X-RAY EXAM OF SHOULDER: CPT | Mod: LT

## 2024-06-17 NOTE — PROGRESS NOTES
History of present illness: Patient well-known to our practice prior hip replacement patient has interestingly fairly severe end-stage arthritis left and right shoulder the left being worse than the right with both clinically and with limited motion and on MRI and x-ray scanning    Physical exam:    General: No acute distress or breathing difficulty or discomfort, pleasant and cooperative with the examination.    Extremities: The left shoulder was inspected and was found to have no obvious deformity.  There was tenderness to touch over the lateral edges of the shoulder over the rotator cuff insertion.  Active forward flexion 120 degrees, external rotation to 60 degrees, abduction to 45 degrees, and internal rotation to the level of L2.    At this time the shoulder is neurovascular tact and neurosensory intact.  Motor intact C5-T1.  There was no obvious neck pain or radiculopathy noted.  There was no gross instability or adhesive capsulitis symptoms.  There was no evidence of apprehension or apprehension suppression.    Strength was tested in 4 planes with weakness in the supraspinatus strength testing and external rotation position.  There was no strength deficit in internal rotation.  Impingement signs were positive both supine and standing for impingement test type I and II.  There was mild pain over the bicipital groove with a positive speeds sign      Right shoulder exam    The right shoulder was inspected and was found to have no obvious deformity.  There was tenderness to touch over the lateral edges of the shoulder over the rotator cuff insertion.  Active forward flexion 160 degrees, external rotation to 70 degrees, abduction to 65 degrees, and internal rotation to the level of L2.    At this time the shoulder is neurovascular tact and neurosensory intact.  Motor intact C5-T1.  There was no obvious neck pain or radiculopathy noted.  There was no gross instability or adhesive capsulitis symptoms.  There was no  evidence of apprehension or apprehension suppression.    Strength was tested in 4 planes with weakness in the supraspinatus strength testing and external rotation position.  There was no strength deficit in internal rotation.  Impingement signs were positive both supine and standing for impingement test type I and II.  There was mild pain over the bicipital groove with a positive speeds sign    Diagnostic studies: X-rays left shoulder today show advanced end-stage arthritic change left shoulder sclerotic change consistent with probable AVN    Patient had an MRI that showed severe arthrosis left shoulder as well with a little bit of cuff tearing or sean partial-thickness    Patient had right shoulder x-rays done already that shows moderate arthritis    Impression: Bilateral shoulder osteoarthritis worse on the left than right    Plan: Fluoro-guided injections both shoulders we will see her back in about 4 to 6 weeks see how she is doing she is only 49 at some point she may need to consider shoulder arthroplasty especially on the left she is already had a right hip replacement a year ago

## 2024-06-19 DIAGNOSIS — M19.09 PRIMARY OSTEOARTHRITIS OF OTHER SITE: ICD-10-CM

## 2024-06-19 DIAGNOSIS — M19.012 OSTEOARTHRITIS OF BOTH SHOULDERS, UNSPECIFIED OSTEOARTHRITIS TYPE: Primary | ICD-10-CM

## 2024-06-19 DIAGNOSIS — M19.011 OSTEOARTHRITIS OF BOTH SHOULDERS, UNSPECIFIED OSTEOARTHRITIS TYPE: Primary | ICD-10-CM

## 2024-06-20 ENCOUNTER — TELEPHONE (OUTPATIENT)
Dept: INTERVENTIONAL RADIOLOGY/VASCULAR | Age: 49
End: 2024-06-20

## 2024-06-21 ENCOUNTER — TELEPHONE (OUTPATIENT)
Dept: INTERVENTIONAL RADIOLOGY/VASCULAR | Age: 49
End: 2024-06-21

## 2024-06-21 NOTE — TELEPHONE ENCOUNTER
Pt states she won't be attending any of the appointments scheduled for the fluoro guided inj referral sent from Dr iJm Paulino.    She will be getting it done with Dr Coulter. She already has appt's scheduled.    I kindly appreciated her for returning our call and informing us on the INJ, we will cancel these procedure appts.

## 2024-06-25 ENCOUNTER — PROCEDURE VISIT (OUTPATIENT)
Age: 49
End: 2024-06-25
Payer: MEDICARE

## 2024-06-25 VITALS
OXYGEN SATURATION: 96 % | WEIGHT: 225 LBS | HEIGHT: 66 IN | BODY MASS INDEX: 36.16 KG/M2 | SYSTOLIC BLOOD PRESSURE: 116 MMHG | DIASTOLIC BLOOD PRESSURE: 78 MMHG | TEMPERATURE: 97.2 F

## 2024-06-25 DIAGNOSIS — M19.011 BILATERAL SHOULDER REGION ARTHRITIS: ICD-10-CM

## 2024-06-25 DIAGNOSIS — G89.4 CHRONIC PAIN SYNDROME: ICD-10-CM

## 2024-06-25 DIAGNOSIS — M47.817 LUMBOSACRAL SPONDYLOSIS WITHOUT MYELOPATHY: ICD-10-CM

## 2024-06-25 DIAGNOSIS — M75.102 BILATERAL ROTATOR CUFF SYNDROME: Primary | ICD-10-CM

## 2024-06-25 DIAGNOSIS — M75.101 BILATERAL ROTATOR CUFF SYNDROME: Primary | ICD-10-CM

## 2024-06-25 DIAGNOSIS — M19.012 BILATERAL SHOULDER REGION ARTHRITIS: ICD-10-CM

## 2024-06-25 PROCEDURE — 99214 OFFICE O/P EST MOD 30 MIN: CPT | Performed by: PAIN MEDICINE

## 2024-06-25 PROCEDURE — 20610 DRAIN/INJ JOINT/BURSA W/O US: CPT | Performed by: PAIN MEDICINE

## 2024-06-25 PROCEDURE — 77002 NEEDLE LOCALIZATION BY XRAY: CPT | Performed by: PAIN MEDICINE

## 2024-06-25 RX ORDER — BETAMETHASONE SODIUM PHOSPHATE AND BETAMETHASONE ACETATE 3; 3 MG/ML; MG/ML
6 INJECTION, SUSPENSION INTRA-ARTICULAR; INTRALESIONAL; INTRAMUSCULAR; SOFT TISSUE ONCE
Status: COMPLETED | OUTPATIENT
Start: 2024-06-25 | End: 2024-06-25

## 2024-06-25 RX ORDER — LIDOCAINE HYDROCHLORIDE 10 MG/ML
3 INJECTION, SOLUTION EPIDURAL; INFILTRATION; INTRACAUDAL; PERINEURAL ONCE
Status: COMPLETED | OUTPATIENT
Start: 2024-06-25 | End: 2024-06-25

## 2024-06-25 RX ORDER — OXYCODONE AND ACETAMINOPHEN 10; 325 MG/1; MG/1
1 TABLET ORAL EVERY 6 HOURS PRN
Qty: 120 TABLET | Refills: 0 | Status: SHIPPED | OUTPATIENT
Start: 2024-06-25 | End: 2024-07-25

## 2024-06-25 RX ADMIN — BETAMETHASONE SODIUM PHOSPHATE AND BETAMETHASONE ACETATE 6 MG: 3; 3 INJECTION, SUSPENSION INTRA-ARTICULAR; INTRALESIONAL; INTRAMUSCULAR at 14:38

## 2024-06-25 RX ADMIN — LIDOCAINE HYDROCHLORIDE 3 ML: 10 INJECTION, SOLUTION EPIDURAL; INFILTRATION; INTRACAUDAL; PERINEURAL at 14:38

## 2024-06-25 NOTE — PROGRESS NOTES
[facility]  Neurosurgery and Pain Management Center  [address], [address]  [place], [place]  P: [phone]  F: [phone]          Tenzin Flores is here for Bilateral sub-acromial bursa injection(s). Written informed consent obtained and area was cleaned with betadine and fluoroscopic guidance was used. 27 gauge 1.5 inch needle was used and advanced to the glenohumeral joint. After negative aspiration, 6mg of Betamethasone along with 2ml of 1% PF Lidocaine was injected without difficulty. Patient tolerated procedure well, no obvious complications occurred and sterile techniques were used throughout the procedure.    Patrick Coulter D.O.

## 2024-06-25 NOTE — PROGRESS NOTES
[facility]  Neurosurgery and Pain Management Center  [address], [address]  [place], [place]  P: [phone]  F: [phone]        Tenzin Flores  (1975)    2024    Subjective:     Tenzin Flores is 49 y.o. female who complains today of:     Chief complaint low back pain    Patient here today for follow-up.  She has chronic pain in her low back significant shoulder arthritis rotator cuff syndrome.  History of hip replacement with Dr. Paulino.  She takes Percocet for chronic pain.  Able to function, do basic activity living, chores around the house.  No side effects or aberrant behavior.  Pain can be achy sharp and varies in intensity.  History of lumbar RF ablation.  Pain worse with overdoing it bending activities standing better with rest.  Using her arms lifting them bothers her significant decreased range of motion.  The pain can be achy sharp with dependency and spasms.  It affects her quality of life.  Left shoulder worse than right.    Assessment: Lumbar spondylosis, lumbar degenerative disc disease, chronic pain syndrome, bilateral shoulder arthritis rotator cuff syndrome    Plan: We discussed options in detail.  Continue Percocet for chronic pain.  OARRS was reviewed.  Narcotic drug policy reviewed.  Follow-up in 1 month.  Continue home excise program.            Allergies:  Red dye, Metronidazole, and Vitamin b12    Past Medical History:   Diagnosis Date    Abnormal EKG 2019    Anemia     Chronic back pain     Family history of coronary artery disease 2019    History of tobacco abuse 2019    Hypotension     Morbid obesity (HCC) 2019    Osteoarthritis      Past Surgical History:   Procedure Laterality Date     SECTION      CYST REMOVAL  2015    FACE LEFT SIDE    HYSTERECTOMY, TOTAL ABDOMINAL (CERVIX REMOVED)      RAAD/ one ovary remains    TOTAL HIP ARTHROPLASTY Right 2023     Family History   Adopted: Yes     Social

## 2024-07-25 DIAGNOSIS — G89.4 CHRONIC PAIN SYNDROME: ICD-10-CM

## 2024-07-25 DIAGNOSIS — M47.817 LUMBOSACRAL SPONDYLOSIS WITHOUT MYELOPATHY: ICD-10-CM

## 2024-07-25 RX ORDER — TIZANIDINE 4 MG/1
TABLET ORAL
Qty: 60 TABLET | Refills: 0 | Status: SHIPPED | OUTPATIENT
Start: 2024-07-25

## 2024-07-25 RX ORDER — OXYCODONE AND ACETAMINOPHEN 10; 325 MG/1; MG/1
1 TABLET ORAL EVERY 6 HOURS PRN
Qty: 120 TABLET | Refills: 0 | Status: SHIPPED | OUTPATIENT
Start: 2024-07-25 | End: 2024-08-24

## 2024-07-25 NOTE — TELEPHONE ENCOUNTER
Requested Prescriptions     Pending Prescriptions Disp Refills    tiZANidine (ZANAFLEX) 4 MG tablet 60 tablet 0     Sig: TAKE 1 TABLET BY MOUTH TWICE DAILY AS NEEDED FOR SPASMS    oxyCODONE-acetaminophen (PERCOCET)  MG per tablet 120 tablet 0     Sig: Take 1 tablet by mouth every 6 hours as needed for Pain for up to 30 days. Intended supply: 30 days Max Daily Amount: 4 tablets       Patient last seen on:  6/25/24 procedure    Date of last refill:  percocet 6/25/24 and tizanidine 5/31/24    Reason for request:  patient request    Request date for pharmacy pick-up:  7/25/24    Next office visit date: Visit date not found    Red dye, Metronidazole, and Vitamin b12

## 2024-08-12 RX ORDER — METHYLPREDNISOLONE 4 MG/1
TABLET ORAL
Qty: 1 KIT | Refills: 0 | Status: SHIPPED | OUTPATIENT
Start: 2024-08-12 | End: 2024-08-18

## 2024-08-23 DIAGNOSIS — G89.4 CHRONIC PAIN SYNDROME: ICD-10-CM

## 2024-08-26 RX ORDER — OXYCODONE AND ACETAMINOPHEN 10; 325 MG/1; MG/1
1 TABLET ORAL EVERY 6 HOURS PRN
Qty: 120 TABLET | Refills: 0 | Status: SHIPPED | OUTPATIENT
Start: 2024-08-26 | End: 2024-09-25

## 2024-08-26 NOTE — TELEPHONE ENCOUNTER
Requested Prescriptions     Pending Prescriptions Disp Refills    oxyCODONE-acetaminophen (PERCOCET)  MG per tablet 120 tablet 0     Sig: Take 1 tablet by mouth every 6 hours as needed for Pain for up to 30 days. Intended supply: 30 days Max Daily Amount: 4 tablets       Patient last seen on:  06/25/2024    Date of last refill:  07/25/2024    Reason for request:  patient called the office to request refill     Request date for pharmacy pick-up:  08/26/2024    Next office visit date: 09/19/2024    Red dye, Metronidazole, and Vitamin b12

## 2024-09-16 ENCOUNTER — OFFICE VISIT (OUTPATIENT)
Age: 49
End: 2024-09-16
Payer: MEDICARE

## 2024-09-16 ENCOUNTER — PROCEDURE VISIT (OUTPATIENT)
Age: 49
End: 2024-09-16
Payer: MEDICARE

## 2024-09-16 ENCOUNTER — TELEPHONE (OUTPATIENT)
Age: 49
End: 2024-09-16

## 2024-09-16 VITALS
SYSTOLIC BLOOD PRESSURE: 100 MMHG | DIASTOLIC BLOOD PRESSURE: 68 MMHG | WEIGHT: 225 LBS | BODY MASS INDEX: 35.31 KG/M2 | HEART RATE: 91 BPM | HEIGHT: 67 IN | TEMPERATURE: 97.7 F | OXYGEN SATURATION: 97 %

## 2024-09-16 VITALS
DIASTOLIC BLOOD PRESSURE: 76 MMHG | WEIGHT: 225 LBS | TEMPERATURE: 97.6 F | BODY MASS INDEX: 35.31 KG/M2 | SYSTOLIC BLOOD PRESSURE: 128 MMHG | HEIGHT: 67 IN

## 2024-09-16 DIAGNOSIS — M75.102 ROTATOR CUFF SYNDROME OF LEFT SHOULDER: ICD-10-CM

## 2024-09-16 DIAGNOSIS — M75.102 ROTATOR CUFF SYNDROME OF LEFT SHOULDER: Primary | ICD-10-CM

## 2024-09-16 DIAGNOSIS — M47.817 LUMBOSACRAL SPONDYLOSIS WITHOUT MYELOPATHY: Primary | ICD-10-CM

## 2024-09-16 DIAGNOSIS — G89.4 CHRONIC PAIN SYNDROME: ICD-10-CM

## 2024-09-16 PROCEDURE — 77002 NEEDLE LOCALIZATION BY XRAY: CPT | Performed by: PAIN MEDICINE

## 2024-09-16 PROCEDURE — 20610 DRAIN/INJ JOINT/BURSA W/O US: CPT | Performed by: PAIN MEDICINE

## 2024-09-16 PROCEDURE — 99215 OFFICE O/P EST HI 40 MIN: CPT | Performed by: PAIN MEDICINE

## 2024-09-16 PROCEDURE — 99214 OFFICE O/P EST MOD 30 MIN: CPT | Performed by: PAIN MEDICINE

## 2024-09-16 RX ORDER — BETAMETHASONE SODIUM PHOSPHATE AND BETAMETHASONE ACETATE 3; 3 MG/ML; MG/ML
6 INJECTION, SUSPENSION INTRA-ARTICULAR; INTRALESIONAL; INTRAMUSCULAR; SOFT TISSUE ONCE
Status: COMPLETED | OUTPATIENT
Start: 2024-09-16 | End: 2024-09-16

## 2024-09-16 RX ORDER — OXYCODONE AND ACETAMINOPHEN 10; 325 MG/1; MG/1
1 TABLET ORAL EVERY 6 HOURS PRN
Qty: 120 TABLET | Refills: 0 | Status: SHIPPED | OUTPATIENT
Start: 2024-09-16 | End: 2024-10-16

## 2024-09-16 RX ORDER — LIDOCAINE HYDROCHLORIDE 10 MG/ML
3 INJECTION, SOLUTION EPIDURAL; INFILTRATION; INTRACAUDAL; PERINEURAL ONCE
Status: COMPLETED | OUTPATIENT
Start: 2024-09-16 | End: 2024-09-16

## 2024-09-16 RX ADMIN — LIDOCAINE HYDROCHLORIDE 3 ML: 10 INJECTION, SOLUTION EPIDURAL; INFILTRATION; INTRACAUDAL; PERINEURAL at 15:14

## 2024-09-16 RX ADMIN — BETAMETHASONE SODIUM PHOSPHATE AND BETAMETHASONE ACETATE 6 MG: 3; 3 INJECTION, SUSPENSION INTRA-ARTICULAR; INTRALESIONAL; INTRAMUSCULAR at 15:11

## 2024-09-16 RX ADMIN — LIDOCAINE HYDROCHLORIDE 3 ML: 10 INJECTION, SOLUTION EPIDURAL; INFILTRATION; INTRACAUDAL; PERINEURAL at 15:36

## 2024-09-16 RX ADMIN — BETAMETHASONE SODIUM PHOSPHATE AND BETAMETHASONE ACETATE 6 MG: 3; 3 INJECTION, SUSPENSION INTRA-ARTICULAR; INTRALESIONAL; INTRAMUSCULAR at 15:36

## 2024-09-25 ENCOUNTER — TELEPHONE (OUTPATIENT)
Age: 49
End: 2024-09-25

## 2024-10-03 ENCOUNTER — PATIENT MESSAGE (OUTPATIENT)
Age: 49
End: 2024-10-03

## 2024-10-04 ENCOUNTER — TELEPHONE (OUTPATIENT)
Age: 49
End: 2024-10-04

## 2024-10-04 NOTE — TELEPHONE ENCOUNTER
Bilateral L3-4-5 RFA approved 10- to 2-8-2025.  Referral # [id number]    OK to schedule procedure approved as above.   Please note sides/levels approved and date range.   (If applicable, sides/levels approved may differ from those ordered)    To be scheduled with Dr. Coulter.

## 2024-10-15 ENCOUNTER — OFFICE VISIT (OUTPATIENT)
Age: 49
End: 2024-10-15
Payer: MEDICARE

## 2024-10-15 VITALS
HEART RATE: 91 BPM | BODY MASS INDEX: 36.1 KG/M2 | HEIGHT: 67 IN | WEIGHT: 230 LBS | SYSTOLIC BLOOD PRESSURE: 114 MMHG | TEMPERATURE: 97.3 F | OXYGEN SATURATION: 98 % | DIASTOLIC BLOOD PRESSURE: 60 MMHG

## 2024-10-15 DIAGNOSIS — M47.817 LUMBOSACRAL SPONDYLOSIS WITHOUT MYELOPATHY: Primary | ICD-10-CM

## 2024-10-15 PROCEDURE — 64636 DESTROY L/S FACET JNT ADDL: CPT | Performed by: PAIN MEDICINE

## 2024-10-15 PROCEDURE — 64635 DESTROY LUMB/SAC FACET JNT: CPT | Performed by: PAIN MEDICINE

## 2024-10-15 RX ORDER — BETAMETHASONE SODIUM PHOSPHATE AND BETAMETHASONE ACETATE 3; 3 MG/ML; MG/ML
6 INJECTION, SUSPENSION INTRA-ARTICULAR; INTRALESIONAL; INTRAMUSCULAR; SOFT TISSUE ONCE
Status: COMPLETED | OUTPATIENT
Start: 2024-10-15 | End: 2024-10-15

## 2024-10-15 RX ORDER — LIDOCAINE HYDROCHLORIDE 10 MG/ML
3 INJECTION, SOLUTION EPIDURAL; INFILTRATION; INTRACAUDAL; PERINEURAL ONCE
Status: COMPLETED | OUTPATIENT
Start: 2024-10-15 | End: 2024-10-15

## 2024-10-15 RX ADMIN — BETAMETHASONE SODIUM PHOSPHATE AND BETAMETHASONE ACETATE 6 MG: 3; 3 INJECTION, SUSPENSION INTRA-ARTICULAR; INTRALESIONAL; INTRAMUSCULAR at 08:51

## 2024-10-15 RX ADMIN — LIDOCAINE HYDROCHLORIDE 3 ML: 10 INJECTION, SOLUTION EPIDURAL; INFILTRATION; INTRACAUDAL; PERINEURAL at 08:52

## 2024-10-15 NOTE — PROGRESS NOTES
[facility]  Neurosurgery and Pain Management Center  [address], [address]  [place], [place]  P: [phone]  F: [phone]      Lumbar Radio Frequency Ablation     Provider: FRANK HALL DO          Patient Name: Tenzin Flores : 1975        Date: 10/15/2024      Tenzin Flores is here today for interventional pain management.  Standard ASI guidelines were followed and sterile technique used.  Area was cleaned with Betadine x3.  Informed consent was obtained.  Fluoroscopic guidance was used for this procedure. Multiple views of fluoroscopy were used during procedure to assist with needle placement. Appropriate sized RF 10mm active tip needle was used and advance to appropriate anatomic location.    There was appropriate multifidus contraction noted with motor stimulation at 2 Hz between 0.5-1.5 volts. No limb or gluteal contraction was noted taking it up to 3.5 volts. Prior to lesioning at 80 degrees Celsius for 90 seconds, approximately 0.75mg/1mg of Celestone and ½ cc of 1% preservative free Lidocaine was injected. Impedance was between 200-500 ohms during the procedure.This procedure was 40% more difficult and required 40% more work secondary to the patient's habitus. The patient has a BMI of 36 and has comorbidities of hypertension and obesity. This required increased work for safe and proper positioning upon the fluoroscopy table, increased needle passes for safe and appropriate needle placement, and increased fluoroscopy time and radiation exposure for proper visualization.         Patient tolerated the procedure well, no obvious complications occurred during the procedure.  Patient was appropriately monitored and discharged home in stable condition with their usual motor strength. Post Op instructions were given to patient.          [x] Bilateral [] T11 [] L1 [] S1     [] T12 [] L2 [] S2    [] Right  [x] L3 [] S3      [x] L4 [] S4    [] Left  [x] L5

## 2024-10-21 ENCOUNTER — PATIENT MESSAGE (OUTPATIENT)
Age: 49
End: 2024-10-21

## 2024-10-21 DIAGNOSIS — M47.817 LUMBOSACRAL SPONDYLOSIS WITHOUT MYELOPATHY: ICD-10-CM

## 2024-10-21 DIAGNOSIS — G89.4 CHRONIC PAIN SYNDROME: ICD-10-CM

## 2024-10-21 RX ORDER — OXYCODONE AND ACETAMINOPHEN 10; 325 MG/1; MG/1
1 TABLET ORAL EVERY 6 HOURS PRN
Qty: 120 TABLET | Refills: 0 | Status: SHIPPED | OUTPATIENT
Start: 2024-10-21 | End: 2024-11-20

## 2024-10-21 NOTE — TELEPHONE ENCOUNTER
Requested Prescriptions     Pending Prescriptions Disp Refills    tiZANidine (ZANAFLEX) 4 MG tablet 60 tablet 0     Sig: TAKE 1 TABLET BY MOUTH TWICE DAILY AS NEEDED FOR SPASMS       Patient last seen on: 10/15/24    Date of last surgery: N/A    Date of last refill:  09/16/24    Reason for request: Dai requested     Request date for pharmacy pick-up: 10/21/24    Next office visit date: Visit date not found    Red dye #40 (allura red), Metronidazole, and Vitamin b12

## 2024-10-21 NOTE — TELEPHONE ENCOUNTER
Requested Prescriptions     Pending Prescriptions Disp Refills    oxyCODONE-acetaminophen (PERCOCET)  MG per tablet 120 tablet 0     Sig: Take 1 tablet by mouth every 6 hours as needed for Pain for up to 30 days. Intended supply: 30 days Max Daily Amount: 4 tablets       Patient last seen on:  09/16/2024    Date of last surgery:  n/a    Date of last refill:  09/16/2024    Reason for request:  out of medication    Request date for pharmacy pick-up:  10/21/2024    Next office visit date: Visit date not found    Red dye #40 (allura red), Metronidazole, and Vitamin b12

## 2024-11-19 DIAGNOSIS — G89.4 CHRONIC PAIN SYNDROME: ICD-10-CM

## 2024-11-19 DIAGNOSIS — M47.817 LUMBOSACRAL SPONDYLOSIS WITHOUT MYELOPATHY: ICD-10-CM

## 2024-11-19 RX ORDER — OXYCODONE AND ACETAMINOPHEN 10; 325 MG/1; MG/1
1 TABLET ORAL EVERY 6 HOURS PRN
Qty: 120 TABLET | Refills: 0 | Status: SHIPPED | OUTPATIENT
Start: 2024-11-19 | End: 2024-12-19

## 2024-11-19 NOTE — TELEPHONE ENCOUNTER
Requested Prescriptions     Pending Prescriptions Disp Refills    tiZANidine (ZANAFLEX) 4 MG tablet 60 tablet 0     Sig: TAKE 1 TABLET BY MOUTH TWICE DAILY AS NEEDED FOR SPASMS    oxyCODONE-acetaminophen (PERCOCET)  MG per tablet 120 tablet 0     Sig: Take 1 tablet by mouth every 6 hours as needed for Pain for up to 30 days. Intended supply: 30 days Max Daily Amount: 4 tablets       Patient last seen on: 10/15/24    Date of last surgery: N/A    Date of last refill: 10/21/24    Reason for request: Dai requested     Request date for pharmacy pick-up: 11/19/24    Next office visit date: Visit date not found    Red dye #40 (allura red), Metronidazole, and Vitamin b12

## 2024-12-17 DIAGNOSIS — G89.4 CHRONIC PAIN SYNDROME: ICD-10-CM

## 2024-12-17 RX ORDER — OXYCODONE AND ACETAMINOPHEN 10; 325 MG/1; MG/1
1 TABLET ORAL EVERY 6 HOURS PRN
Qty: 120 TABLET | Refills: 0 | Status: SHIPPED | OUTPATIENT
Start: 2024-12-17 | End: 2025-01-16

## 2024-12-17 NOTE — TELEPHONE ENCOUNTER
Requested Prescriptions     Pending Prescriptions Disp Refills    oxyCODONE-acetaminophen (PERCOCET)  MG per tablet 120 tablet 0     Sig: Take 1 tablet by mouth every 6 hours as needed for Pain for up to 30 days. Intended supply: 30 days Max Daily Amount: 4 tablets       Patient last seen on:  10/15/2024    Date of last refill:  11/19/2024    Next office visit date: Visit date not found    Red dye #40 (allura red), Metronidazole, and Vitamin b12

## 2025-01-15 DIAGNOSIS — G89.4 CHRONIC PAIN SYNDROME: ICD-10-CM

## 2025-01-15 DIAGNOSIS — M47.817 LUMBOSACRAL SPONDYLOSIS WITHOUT MYELOPATHY: ICD-10-CM

## 2025-01-17 DIAGNOSIS — G89.4 CHRONIC PAIN SYNDROME: ICD-10-CM

## 2025-01-17 DIAGNOSIS — M47.817 LUMBOSACRAL SPONDYLOSIS WITHOUT MYELOPATHY: ICD-10-CM

## 2025-01-17 RX ORDER — OXYCODONE AND ACETAMINOPHEN 10; 325 MG/1; MG/1
1 TABLET ORAL EVERY 6 HOURS PRN
Qty: 120 TABLET | Refills: 0 | Status: SHIPPED | OUTPATIENT
Start: 2025-01-17 | End: 2025-02-16

## 2025-01-17 NOTE — TELEPHONE ENCOUNTER
Requested Prescriptions     Pending Prescriptions Disp Refills    tiZANidine (ZANAFLEX) 4 MG tablet 60 tablet 0     Sig: TAKE 1 TABLET BY MOUTH TWICE DAILY AS NEEDED FOR SPASMS    oxyCODONE-acetaminophen (PERCOCET)  MG per tablet 120 tablet 0     Sig: Take 1 tablet by mouth every 6 hours as needed for Pain for up to 30 days. Intended supply: 30 days Max Daily Amount: 4 tablets       Patient last seen on:  10/15/2024    Date of last surgery:  n/a    Date of last refill:  Percocet 12/17/24, Zanaflex 11/19/2024    Reason for request:  needs a refill on medicataions    Request date for pharmacy pick-up:  1/17/2025    Next office visit date: 2/20/2025    Red dye #40 (allura red), Metronidazole, and Vitamin b12

## 2025-01-20 RX ORDER — OXYCODONE AND ACETAMINOPHEN 10; 325 MG/1; MG/1
1 TABLET ORAL EVERY 6 HOURS PRN
Qty: 120 TABLET | Refills: 0 | Status: SHIPPED | OUTPATIENT
Start: 2025-01-20 | End: 2025-02-19

## 2025-01-20 RX ORDER — OXYCODONE AND ACETAMINOPHEN 10; 325 MG/1; MG/1
1 TABLET ORAL EVERY 6 HOURS PRN
Qty: 120 TABLET | Refills: 0 | OUTPATIENT
Start: 2025-01-20 | End: 2025-02-19

## 2025-02-13 DIAGNOSIS — G89.4 CHRONIC PAIN SYNDROME: ICD-10-CM

## 2025-02-13 RX ORDER — OXYCODONE AND ACETAMINOPHEN 10; 325 MG/1; MG/1
1 TABLET ORAL EVERY 6 HOURS PRN
Qty: 28 TABLET | Refills: 0 | Status: SHIPPED | OUTPATIENT
Start: 2025-02-13 | End: 2025-02-20

## 2025-02-13 NOTE — TELEPHONE ENCOUNTER
Requested Prescriptions     Pending Prescriptions Disp Refills    oxyCODONE-acetaminophen (PERCOCET)  MG per tablet 120 tablet 0     Sig: Take 1 tablet by mouth every 6 hours as needed for Pain for up to 30 days. Intended supply: 30 days Max Daily Amount: 4 tablets       Patient last seen on:  10/15/24    Date of last surgery:  10/15/24    Date of last refill:  1/17/25    Reason for request:  PATIENT WILL BE OUT 2/16 OF MEDICATION     Request date for pharmacy pick-up:  2/17/25    Next office visit date: 2/20/2025    Red dye #40 (allura red), Metronidazole, and Vitamin b12

## 2025-02-20 ENCOUNTER — OFFICE VISIT (OUTPATIENT)
Age: 50
End: 2025-02-20
Payer: MEDICARE

## 2025-02-20 VITALS
OXYGEN SATURATION: 98 % | HEART RATE: 103 BPM | BODY MASS INDEX: 36.1 KG/M2 | HEIGHT: 67 IN | WEIGHT: 230 LBS | TEMPERATURE: 97.1 F | DIASTOLIC BLOOD PRESSURE: 64 MMHG | SYSTOLIC BLOOD PRESSURE: 128 MMHG

## 2025-02-20 DIAGNOSIS — M47.817 LUMBOSACRAL SPONDYLOSIS WITHOUT MYELOPATHY: ICD-10-CM

## 2025-02-20 DIAGNOSIS — G89.4 CHRONIC PAIN SYNDROME: Primary | ICD-10-CM

## 2025-02-20 DIAGNOSIS — M75.101 BILATERAL ROTATOR CUFF SYNDROME: ICD-10-CM

## 2025-02-20 DIAGNOSIS — M46.1 SACROILIITIS, NOT ELSEWHERE CLASSIFIED: ICD-10-CM

## 2025-02-20 DIAGNOSIS — M75.102 BILATERAL ROTATOR CUFF SYNDROME: ICD-10-CM

## 2025-02-20 PROCEDURE — 99215 OFFICE O/P EST HI 40 MIN: CPT | Performed by: PAIN MEDICINE

## 2025-02-20 RX ORDER — OXYCODONE AND ACETAMINOPHEN 10; 325 MG/1; MG/1
1 TABLET ORAL EVERY 6 HOURS PRN
Qty: 120 TABLET | Refills: 0 | Status: SHIPPED | OUTPATIENT
Start: 2025-02-20 | End: 2025-03-22

## 2025-02-20 NOTE — PROGRESS NOTES
[facility]  Neurosurgery and Pain Management Center  P: [phone]  F: [phone]        Tenzin Flores  (1975)    2/20/2025    Subjective:     Tenzin Flores is 49 y.o. female who complains today of:     Chief Complaint   Patient presents with    Follow-up    Back Pain     Patient here today for follow-up.  Patient has chronic pain.  Patient is tolerating the pain medications without difficulty.  The pain affects patient's quality life.  Pain usually worse with activity bending twisting better with rest.  Sleep can be impaired.  Pain can be achy sharp, sometimes with spasms.  It varies in intensity.  NRS pain level with and without the medication respectively is 5 and 9.  More pain in the shoulders worse on left than right.  The ablation helped over 50%.    Assessment: Chronic pain syndrome, lumbar spondylosis, bilateral shoulder rotator cuff syndrome    Plan: We discussed treatment options in detail today. I am the primary provider for this patient's complex chronic pain condition that requires ongoing medical management.  The nature of this condition and indicated treatment requires a longitudinal relationship and continual monitoring over time for appropriate safety and response.  Shared goals were created and discussed including a reduction in pain intensity and improvement in ability to complete activities of daily living.    Continue Percocet for chronic pain.  Continue Zanaflex for spasms.  Will set up bilateral shoulder subacromial injections.  Pain generators reviewed.  Anatomic model of pathology reviewed.  [place]AR[place] was reviewed.  Narcotic drug policy reviewed in detail.  Risks and benefits with narcotics reviewed.  Continue current pain medications for chronic pain.  No side effects or aberrant behaviors noted.  The pain medications help with quality of life activities of daily living chores around the house.  Continue home exercise program as tolerated.

## 2025-02-21 ENCOUNTER — TELEPHONE (OUTPATIENT)
Age: 50
End: 2025-02-21

## 2025-02-21 NOTE — TELEPHONE ENCOUNTER
Bilateral Shoulder Injection-No auth required.  Referral #[id number]    OK to schedule procedure approved as above.   Please note sides/levels approved and date range.   (If applicable, sides/levels approved may differ from those ordered)    To be scheduled with Dr. Coulter.

## 2025-02-24 ENCOUNTER — PROCEDURE VISIT (OUTPATIENT)
Age: 50
End: 2025-02-24
Payer: MEDICARE

## 2025-02-24 VITALS
OXYGEN SATURATION: 100 % | HEIGHT: 67 IN | TEMPERATURE: 98.5 F | DIASTOLIC BLOOD PRESSURE: 68 MMHG | BODY MASS INDEX: 36.1 KG/M2 | SYSTOLIC BLOOD PRESSURE: 124 MMHG | HEART RATE: 104 BPM | WEIGHT: 230 LBS

## 2025-02-24 DIAGNOSIS — M75.101 BILATERAL ROTATOR CUFF SYNDROME: Primary | ICD-10-CM

## 2025-02-24 DIAGNOSIS — M75.102 BILATERAL ROTATOR CUFF SYNDROME: Primary | ICD-10-CM

## 2025-02-24 PROCEDURE — 77002 NEEDLE LOCALIZATION BY XRAY: CPT | Performed by: PAIN MEDICINE

## 2025-02-24 PROCEDURE — 20610 DRAIN/INJ JOINT/BURSA W/O US: CPT | Performed by: PAIN MEDICINE

## 2025-02-24 RX ORDER — BETAMETHASONE SODIUM PHOSPHATE AND BETAMETHASONE ACETATE 3; 3 MG/ML; MG/ML
6 INJECTION, SUSPENSION INTRA-ARTICULAR; INTRALESIONAL; INTRAMUSCULAR; SOFT TISSUE ONCE
Status: COMPLETED | OUTPATIENT
Start: 2025-02-24 | End: 2025-02-24

## 2025-02-24 RX ORDER — LIDOCAINE HYDROCHLORIDE 10 MG/ML
3 INJECTION, SOLUTION EPIDURAL; INFILTRATION; INTRACAUDAL; PERINEURAL ONCE
Status: COMPLETED | OUTPATIENT
Start: 2025-02-24 | End: 2025-02-24

## 2025-02-24 RX ADMIN — BETAMETHASONE SODIUM PHOSPHATE AND BETAMETHASONE ACETATE 6 MG: 3; 3 INJECTION, SUSPENSION INTRA-ARTICULAR; INTRALESIONAL; INTRAMUSCULAR at 14:46

## 2025-02-24 RX ADMIN — LIDOCAINE HYDROCHLORIDE 3 ML: 10 INJECTION, SOLUTION EPIDURAL; INFILTRATION; INTRACAUDAL; PERINEURAL at 14:47

## 2025-02-24 NOTE — PROGRESS NOTES
[facility]  Neurosurgery and Pain Management Center  [address], [address]  [place], [place]  P: [phone]  F: [phone]          Tenzin Flores is here for Bilateral sub-acromial bursa injection(s). Written informed consent obtained and area was cleaned with betadine and fluoroscopic guidance was used. 27 gauge 1.5 inch needle was used and advanced to the sub-acromial space without difficulty. After negative aspiration, 6mg of Betamethasone along with 2ml of 1% PF Lidocaine was injected without difficulty. Patient tolerated procedure well, no obvious complications occurred and sterile techniques were used throughout the procedure.This procedure was 30% more difficult and required 30% more work secondary to the patient's habitus. The patient has a BMI of 36 and has comorbidities of hypertension and arthritis. This required increased work for safe and proper positioning upon the fluoroscopy table, increased needle passes for safe and appropriate needle placement, and increased fluoroscopy time and radiation exposure for proper visualization.        Patrick Coulter D.O.

## 2025-03-03 ENCOUNTER — OFFICE VISIT (OUTPATIENT)
Age: 50
End: 2025-03-03
Payer: MEDICARE

## 2025-03-03 ENCOUNTER — TELEPHONE (OUTPATIENT)
Age: 50
End: 2025-03-03

## 2025-03-03 VITALS
HEIGHT: 67 IN | DIASTOLIC BLOOD PRESSURE: 65 MMHG | BODY MASS INDEX: 36.1 KG/M2 | WEIGHT: 230 LBS | TEMPERATURE: 98.5 F | SYSTOLIC BLOOD PRESSURE: 120 MMHG

## 2025-03-03 DIAGNOSIS — M75.101 BILATERAL ROTATOR CUFF SYNDROME: ICD-10-CM

## 2025-03-03 DIAGNOSIS — M54.16 LUMBAR RADICULOPATHY: Primary | ICD-10-CM

## 2025-03-03 DIAGNOSIS — M47.817 LUMBOSACRAL SPONDYLOSIS WITHOUT MYELOPATHY: ICD-10-CM

## 2025-03-03 DIAGNOSIS — M75.102 BILATERAL ROTATOR CUFF SYNDROME: ICD-10-CM

## 2025-03-03 DIAGNOSIS — G89.4 CHRONIC PAIN SYNDROME: ICD-10-CM

## 2025-03-03 PROCEDURE — 4004F PT TOBACCO SCREEN RCVD TLK: CPT | Performed by: PAIN MEDICINE

## 2025-03-03 PROCEDURE — G2211 COMPLEX E/M VISIT ADD ON: HCPCS | Performed by: PAIN MEDICINE

## 2025-03-03 PROCEDURE — 99215 OFFICE O/P EST HI 40 MIN: CPT | Performed by: PAIN MEDICINE

## 2025-03-03 PROCEDURE — 99214 OFFICE O/P EST MOD 30 MIN: CPT | Performed by: PAIN MEDICINE

## 2025-03-03 PROCEDURE — G8417 CALC BMI ABV UP PARAM F/U: HCPCS | Performed by: PAIN MEDICINE

## 2025-03-03 PROCEDURE — 3017F COLORECTAL CA SCREEN DOC REV: CPT | Performed by: PAIN MEDICINE

## 2025-03-03 PROCEDURE — G8427 DOCREV CUR MEDS BY ELIG CLIN: HCPCS | Performed by: PAIN MEDICINE

## 2025-03-03 RX ORDER — METHYLPREDNISOLONE 4 MG/1
TABLET ORAL
Qty: 1 KIT | Refills: 0 | Status: SHIPPED | OUTPATIENT
Start: 2025-03-03 | End: 2025-03-09

## 2025-03-03 NOTE — TELEPHONE ENCOUNTER
Pt was calling stating that she is having a flare up of her back pain/Sciatic nerve that goes down her legs. This has been going on for 2 days and it is making it hard to walk. Please Advise.

## 2025-03-03 NOTE — PROGRESS NOTES
darifenacin (ENABLEX) 15 MG extended release tablet Take 1 tablet by mouth daily 15 tablet 0     Current Facility-Administered Medications on File Prior to Visit   Medication Dose Route Frequency Provider Last Rate Last Admin    iopamidol (ISOVUE-370) 76 % injection 1 mL  1 mL Other ONCE PRN Patrick Coulter,         sodium chloride bacteriostatic 0.9 % injection 1 mL  1 mL Injection Once Patrick Coulter,                HPI    Review of Systems      Objective:     Vitals:  /65 (Site: Left Upper Arm, Position: Sitting, Cuff Size: Large Adult)   Temp 98.5 °F (36.9 °C) (Temporal)   Ht 1.702 m (5' 7\")   Wt 104.3 kg (230 lb)   BMI 36.02 kg/m² Pain Score:  10 - Worst pain ever      Physical Exam      Assessment:         Plan:

## 2025-03-04 ENCOUNTER — TELEPHONE (OUTPATIENT)
Age: 50
End: 2025-03-04

## 2025-03-04 NOTE — TELEPHONE ENCOUNTER
RIGHT L4-5 TRANSFORAMINAL    NO AUTH REQUIRED  OK to schedule procedure approved as above.   Please note sides/levels approved and date range.   (If applicable, sides/levels approved may differ from those ordered)    TO BE SCHEDULED WITH

## 2025-03-11 ENCOUNTER — PROCEDURE VISIT (OUTPATIENT)
Age: 50
End: 2025-03-11
Payer: MEDICARE

## 2025-03-11 VITALS
OXYGEN SATURATION: 97 % | SYSTOLIC BLOOD PRESSURE: 134 MMHG | TEMPERATURE: 97.5 F | HEIGHT: 67 IN | HEART RATE: 93 BPM | WEIGHT: 230 LBS | BODY MASS INDEX: 36.1 KG/M2 | DIASTOLIC BLOOD PRESSURE: 72 MMHG

## 2025-03-11 DIAGNOSIS — M54.16 LUMBAR RADICULOPATHY: Primary | ICD-10-CM

## 2025-03-11 PROCEDURE — 64483 NJX AA&/STRD TFRM EPI L/S 1: CPT | Performed by: PAIN MEDICINE

## 2025-03-11 RX ORDER — DEXAMETHASONE SODIUM PHOSPHATE 10 MG/ML
10 INJECTION, SOLUTION INTRAMUSCULAR; INTRAVENOUS ONCE
Status: COMPLETED | OUTPATIENT
Start: 2025-03-11 | End: 2025-03-11

## 2025-03-11 RX ADMIN — DEXAMETHASONE SODIUM PHOSPHATE 10 MG: 10 INJECTION, SOLUTION INTRAMUSCULAR; INTRAVENOUS at 15:40

## 2025-03-11 ASSESSMENT — PAIN DESCRIPTION - LOCATION
LOCATION: BACK
LOCATION: BACK

## 2025-03-11 ASSESSMENT — PAIN SCALES - GENERAL
PAINLEVEL_OUTOF10: 8
PAINLEVEL_OUTOF10: 7

## 2025-03-11 NOTE — PROGRESS NOTES
[facility] [employer]  Spine Surgery  Advanced Pain Management      Patient Name: Tenzin Flores : 1975  Date: 3/11/2025   Physician: FRANK HALL DO      Tenzin Flores  is here today for interventional pain management.    Preoperatively, the patient presents with radicular pain in the affected area as per history and exam. Patient has failed NSAIDs, PT, and conservative treatment. Patient has significant psychological and functional impairment due to this condition.  Standard ASI guidelines were followed and sterile technique used.  Area was cleaned with Betadine x3.  Informed consent was obtained.  Fluoroscopic guidance was used for this procedure.    TRANSFORAMINAL EPIDURAL  There was appropriate spread of contrast in the anterior epidural space and around the exiting nerve root. The 6 o’clock position of the pedicle was identified. Multiple views of fluoroscopy including lateral were used to confirm accurate needle placement of depth. Live fluoroscopy was used when injecting contrast to ensure no subdural or vascular spread. In total, approximately 5 mg of Dexamethasone and 1.0 ml of 0.9cc of normal saline was injected slowly without difficulty.    Patient tolerated the procedure well, no obvious complications occurred during the procedure.  Patient was appropriately monitored and discharged home in stable condition with their usual motor strength. Post Op instructions were given to patient. Patient will resume blood thinners after procedure if they stopped them before procedure. Relevant imaging was reviewed with patient.           [] Bilateral [] T12-L1 [] L3-4        [] L1-2 [x] L4-5       [x] Right [] L2-3 [] L5-S1                [] Left                  FRANK HALL DO      [address], [address] [place], [place] [address]  Phone [phone]/Fax [phone]/[email or web address]

## 2025-03-18 DIAGNOSIS — G89.4 CHRONIC PAIN SYNDROME: ICD-10-CM

## 2025-03-18 RX ORDER — OXYCODONE AND ACETAMINOPHEN 10; 325 MG/1; MG/1
1 TABLET ORAL EVERY 6 HOURS PRN
Qty: 120 TABLET | Refills: 0 | Status: SHIPPED | OUTPATIENT
Start: 2025-03-18 | End: 2025-04-17

## 2025-04-14 ENCOUNTER — TELEPHONE (OUTPATIENT)
Age: 50
End: 2025-04-14

## 2025-04-14 RX ORDER — METHYLPREDNISOLONE 4 MG/1
TABLET ORAL
Qty: 1 KIT | Refills: 0 | Status: SHIPPED | OUTPATIENT
Start: 2025-04-14 | End: 2025-04-20

## 2025-04-14 NOTE — TELEPHONE ENCOUNTER
Patient is calling to request an order for the RFA to be placed and sent to Prior Auth dept   Patient stated she is in severe pain and is requesting if she has to wait for order is she able to have a medrol dose pack called in to the pharmacy

## 2025-04-17 ENCOUNTER — OFFICE VISIT (OUTPATIENT)
Age: 50
End: 2025-04-17
Payer: COMMERCIAL

## 2025-04-17 VITALS — HEART RATE: 73 BPM | BODY MASS INDEX: 36.1 KG/M2 | WEIGHT: 230 LBS | OXYGEN SATURATION: 99 % | HEIGHT: 67 IN

## 2025-04-17 DIAGNOSIS — M47.817 LUMBOSACRAL SPONDYLOSIS WITHOUT MYELOPATHY: Primary | ICD-10-CM

## 2025-04-17 DIAGNOSIS — G89.4 CHRONIC PAIN SYNDROME: ICD-10-CM

## 2025-04-17 DIAGNOSIS — M54.16 LUMBAR RADICULOPATHY: ICD-10-CM

## 2025-04-17 PROCEDURE — 99214 OFFICE O/P EST MOD 30 MIN: CPT | Performed by: PAIN MEDICINE

## 2025-04-17 PROCEDURE — 4004F PT TOBACCO SCREEN RCVD TLK: CPT | Performed by: PAIN MEDICINE

## 2025-04-17 PROCEDURE — 3017F COLORECTAL CA SCREEN DOC REV: CPT | Performed by: PAIN MEDICINE

## 2025-04-17 PROCEDURE — G2211 COMPLEX E/M VISIT ADD ON: HCPCS | Performed by: PAIN MEDICINE

## 2025-04-17 PROCEDURE — G8417 CALC BMI ABV UP PARAM F/U: HCPCS | Performed by: PAIN MEDICINE

## 2025-04-17 PROCEDURE — G8427 DOCREV CUR MEDS BY ELIG CLIN: HCPCS | Performed by: PAIN MEDICINE

## 2025-04-17 RX ORDER — OXYCODONE AND ACETAMINOPHEN 10; 325 MG/1; MG/1
1 TABLET ORAL EVERY 6 HOURS PRN
Qty: 120 TABLET | Refills: 0 | Status: SHIPPED | OUTPATIENT
Start: 2025-04-17 | End: 2025-05-17

## 2025-04-17 NOTE — PROGRESS NOTES
benefits with narcotics reviewed.  Continue current pain medications for chronic pain.  No side effects or aberrant behaviors noted.  The pain medications help with quality of life activities of daily living chores around the house.  Continue home exercise program as tolerated.  Relevant imaging studies reviewed.  All questions answered.  Chart was reviewed.  Patient is in agreement with the plan.  Follow-up roughly in 1 month.      Physical Exam:  Patient is alert and oriented x 3. Recent and remote memory is intact. Mood and affect is normal.  Judgement and insight is normal.  Head normacephalic.  Eyes - PERRLA, EOMI, no obvious external masses seen ears nose and throat.  Neck supple.  Trachea midline, no abnormal lymph nodes visualized in neck or surpaclavicular region. Cranial nerves 2-12 grossly intact.  Strength functional for ambulation.  Balance functional.  Coordination normal.  Visualized skin intact. No obvious lesions or visualized cyanosis.  No swelling.  Pulses intact.  No obvious upper motor neuron signs.  Sensation grossly intact to light touch.  Strength functional upper/lower extremities .  SLR negative.   Tender along lumber facet joints with pain and decreased ROM.  Extension and rotation with muscle spasms.            Allergies:  Red dye, Red dye #40 (allura red), Metronidazole, and Vitamin b12    Past Medical History:   Diagnosis Date    Abnormal EKG 2019    Anemia     Chronic back pain     Family history of coronary artery disease 2019    History of tobacco abuse 2019    Hypotension     Morbid obesity 2019    Osteoarthritis      Past Surgical History:   Procedure Laterality Date     SECTION      CYST REMOVAL  2015    FACE LEFT SIDE    HYSTERECTOMY, TOTAL ABDOMINAL (CERVIX REMOVED)      RAAD/ one ovary remains    TOTAL HIP ARTHROPLASTY Right 2023     Family History   Adopted: Yes     Social History     Socioeconomic History    Marital status:      Spouse

## 2025-04-22 ENCOUNTER — TELEPHONE (OUTPATIENT)
Age: 50
End: 2025-04-22

## 2025-04-22 NOTE — TELEPHONE ENCOUNTER
Bilateral L3-4-5 RFA approved 5-5-2025 to 9-2-2025.  Referral #[id number]     OK to schedule procedure approved as above.   Please note sides/levels approved and date range.   (If applicable, sides/levels approved may differ from those ordered)    To be scheduled with Dr. Coulter.

## 2025-04-22 NOTE — TELEPHONE ENCOUNTER
Call placed to patient RFA scheduled.     Bilateral L3-4-5 RFA approved 5-5-2025 to 9-2-2025.

## 2025-04-29 ENCOUNTER — TELEPHONE (OUTPATIENT)
Age: 50
End: 2025-04-29

## 2025-04-29 NOTE — TELEPHONE ENCOUNTER
Pt called she has her procedure scheduled for 5/5 since that is when the authorization starts. She is stating she is in a lot of pain it is unbearable. Is there something Dr. Coulter can call in for her until the procedure or what can she do for the pain? Please Advise.

## 2025-04-30 RX ORDER — METHYLPREDNISOLONE 4 MG/1
TABLET ORAL
Qty: 1 KIT | Refills: 0 | Status: SHIPPED | OUTPATIENT
Start: 2025-04-30 | End: 2025-05-06

## 2025-04-30 NOTE — TELEPHONE ENCOUNTER
Pt called again in tears. She is a lot of pain and would like the steroid pack called in that Dr. Coulter suggested yesterday.

## 2025-04-30 NOTE — TELEPHONE ENCOUNTER
Patient is calling back asking if the steroid pack can be called in today.  Patient stated pain level is above a 10

## 2025-05-05 ENCOUNTER — OFFICE VISIT (OUTPATIENT)
Age: 50
End: 2025-05-05
Payer: COMMERCIAL

## 2025-05-05 VITALS
HEIGHT: 67 IN | WEIGHT: 230 LBS | TEMPERATURE: 97.2 F | OXYGEN SATURATION: 100 % | BODY MASS INDEX: 36.1 KG/M2 | DIASTOLIC BLOOD PRESSURE: 80 MMHG | HEART RATE: 81 BPM | SYSTOLIC BLOOD PRESSURE: 130 MMHG

## 2025-05-05 DIAGNOSIS — M47.817 LUMBOSACRAL SPONDYLOSIS WITHOUT MYELOPATHY: Primary | ICD-10-CM

## 2025-05-05 PROCEDURE — 64636 DESTROY L/S FACET JNT ADDL: CPT | Performed by: PAIN MEDICINE

## 2025-05-05 PROCEDURE — 64635 DESTROY LUMB/SAC FACET JNT: CPT | Performed by: PAIN MEDICINE

## 2025-05-05 RX ORDER — BETAMETHASONE SODIUM PHOSPHATE AND BETAMETHASONE ACETATE 3; 3 MG/ML; MG/ML
6 INJECTION, SUSPENSION INTRA-ARTICULAR; INTRALESIONAL; INTRAMUSCULAR; SOFT TISSUE ONCE
Status: COMPLETED | OUTPATIENT
Start: 2025-05-05 | End: 2025-05-05

## 2025-05-05 RX ORDER — LIDOCAINE HYDROCHLORIDE 10 MG/ML
3 INJECTION, SOLUTION EPIDURAL; INFILTRATION; INTRACAUDAL; PERINEURAL ONCE
Status: COMPLETED | OUTPATIENT
Start: 2025-05-05 | End: 2025-05-05

## 2025-05-05 RX ADMIN — BETAMETHASONE SODIUM PHOSPHATE AND BETAMETHASONE ACETATE 6 MG: 3; 3 INJECTION, SUSPENSION INTRA-ARTICULAR; INTRALESIONAL; INTRAMUSCULAR at 14:29

## 2025-05-05 RX ADMIN — LIDOCAINE HYDROCHLORIDE 3 ML: 10 INJECTION, SOLUTION EPIDURAL; INFILTRATION; INTRACAUDAL; PERINEURAL at 14:29

## 2025-05-05 ASSESSMENT — PAIN DESCRIPTION - LOCATION
LOCATION: BACK
LOCATION: BACK

## 2025-05-05 ASSESSMENT — PAIN SCALES - GENERAL
PAINLEVEL_OUTOF10: 8
PAINLEVEL_OUTOF10: 10

## 2025-05-05 NOTE — PROGRESS NOTES
[facility]  Neurosurgery and Pain Management Center  [address], [address]  [place], [place]  P: [phone]  F: [phone]      Lumbar Radio Frequency Ablation     Provider: FRANK HALL DO          Patient Name: Tenzin Flores : 1975        Date: 2025      Tenzin Flores is here today for interventional pain management.  Standard ASI guidelines were followed and sterile technique used.  Area was cleaned with Betadine x3.  Informed consent was obtained.  Fluoroscopic guidance was used for this procedure. Multiple views of fluoroscopy were used during procedure to assist with needle placement. Appropriate sized RF 10mm active tip needle was used and advance to appropriate anatomic location.    There was appropriate multifidus contraction noted with motor stimulation at 2 Hz between 0.5-1.5 volts. No limb or gluteal contraction was noted taking it up to 3.5 volts. Prior to lesioning at 80 degrees Celsius for 90 seconds, approximately 0.75mg/1mg of Celestone and ½ cc of 1% preservative free Lidocaine was injected. Impedance was between 200-500 ohms during the procedure.This procedure was 30% more difficult and required 30% more work secondary to the patient's habitus. The patient has a BMI of 36 and has comorbidities of hypertension and obesity. This required increased work for safe and proper positioning upon the fluoroscopy table, increased needle passes for safe and appropriate needle placement, and increased fluoroscopy time and radiation exposure for proper visualization.         Patient tolerated the procedure well, no obvious complications occurred during the procedure.  Patient was appropriately monitored and discharged home in stable condition with their usual motor strength. Post Op instructions were given to patient.          [x] Bilateral [] T11 [] L1 [] S1     [] T12 [] L2 [] S2    [] Right  [x] L3 [] S3      [x] L4 [] S4    [] Left  [x] L5

## 2025-05-14 DIAGNOSIS — G89.4 CHRONIC PAIN SYNDROME: ICD-10-CM

## 2025-05-14 DIAGNOSIS — M47.817 LUMBOSACRAL SPONDYLOSIS WITHOUT MYELOPATHY: ICD-10-CM

## 2025-05-14 RX ORDER — OXYCODONE AND ACETAMINOPHEN 10; 325 MG/1; MG/1
1 TABLET ORAL EVERY 6 HOURS PRN
Qty: 120 TABLET | Refills: 0 | Status: SHIPPED | OUTPATIENT
Start: 2025-05-14 | End: 2025-06-13

## 2025-06-12 ENCOUNTER — OFFICE VISIT (OUTPATIENT)
Age: 50
End: 2025-06-12
Payer: COMMERCIAL

## 2025-06-12 VITALS
HEIGHT: 67 IN | HEART RATE: 86 BPM | WEIGHT: 231 LBS | BODY MASS INDEX: 36.26 KG/M2 | OXYGEN SATURATION: 98 % | TEMPERATURE: 97.4 F | SYSTOLIC BLOOD PRESSURE: 132 MMHG | DIASTOLIC BLOOD PRESSURE: 78 MMHG

## 2025-06-12 DIAGNOSIS — M54.16 LUMBAR RADICULOPATHY: ICD-10-CM

## 2025-06-12 DIAGNOSIS — G89.4 CHRONIC PAIN SYNDROME: Primary | ICD-10-CM

## 2025-06-12 DIAGNOSIS — M47.817 LUMBOSACRAL SPONDYLOSIS WITHOUT MYELOPATHY: ICD-10-CM

## 2025-06-12 PROCEDURE — 3017F COLORECTAL CA SCREEN DOC REV: CPT | Performed by: PAIN MEDICINE

## 2025-06-12 PROCEDURE — 4004F PT TOBACCO SCREEN RCVD TLK: CPT | Performed by: PAIN MEDICINE

## 2025-06-12 PROCEDURE — G8417 CALC BMI ABV UP PARAM F/U: HCPCS | Performed by: PAIN MEDICINE

## 2025-06-12 PROCEDURE — 99214 OFFICE O/P EST MOD 30 MIN: CPT | Performed by: PAIN MEDICINE

## 2025-06-12 PROCEDURE — G2211 COMPLEX E/M VISIT ADD ON: HCPCS | Performed by: PAIN MEDICINE

## 2025-06-12 PROCEDURE — G8427 DOCREV CUR MEDS BY ELIG CLIN: HCPCS | Performed by: PAIN MEDICINE

## 2025-06-12 RX ORDER — OXYCODONE AND ACETAMINOPHEN 10; 325 MG/1; MG/1
1 TABLET ORAL EVERY 6 HOURS PRN
Qty: 120 TABLET | Refills: 0 | Status: SHIPPED | OUTPATIENT
Start: 2025-06-12 | End: 2025-07-12

## 2025-06-12 NOTE — PROGRESS NOTES
MULTIPLE VITAMIN PO Take by mouth      loratadine (CLARITIN) 10 MG tablet Take 1 tablet by mouth daily 30 tablet 0     Current Facility-Administered Medications on File Prior to Visit   Medication Dose Route Frequency Provider Last Rate Last Admin    iopamidol (ISOVUE-370) 76 % injection 1 mL  1 mL Other ONCE PRN Patrick Coulter, DO        sodium chloride bacteriostatic 0.9 % injection 1 mL  1 mL Injection Once Patrick Coulter, DO               HPI    Review of Systems   All other systems reviewed and are negative.        Objective:     Vitals:  There were no vitals taken for this visit.       Physical Exam      Assessment:         Plan:

## 2025-06-13 ENCOUNTER — TELEPHONE (OUTPATIENT)
Age: 50
End: 2025-06-13

## 2025-06-16 ENCOUNTER — HOSPITAL ENCOUNTER (OUTPATIENT)
Age: 50
Discharge: HOME | End: 2025-06-16
Payer: COMMERCIAL

## 2025-06-16 ENCOUNTER — HOSPITAL ENCOUNTER (OUTPATIENT)
Dept: GENERAL RADIOLOGY | Age: 50
Discharge: HOME | End: 2025-06-18
Attending: PAIN MEDICINE
Payer: COMMERCIAL

## 2025-06-16 VITALS
HEART RATE: 81 BPM | OXYGEN SATURATION: 98 % | WEIGHT: 231 LBS | HEIGHT: 67 IN | DIASTOLIC BLOOD PRESSURE: 68 MMHG | TEMPERATURE: 97.5 F | SYSTOLIC BLOOD PRESSURE: 126 MMHG | BODY MASS INDEX: 36.26 KG/M2

## 2025-06-16 DIAGNOSIS — M25.552 BILATERAL HIP PAIN: ICD-10-CM

## 2025-06-16 DIAGNOSIS — M47.817 LUMBOSACRAL SPONDYLOSIS WITHOUT MYELOPATHY: ICD-10-CM

## 2025-06-16 DIAGNOSIS — M25.551 BILATERAL HIP PAIN: ICD-10-CM

## 2025-06-16 DIAGNOSIS — M25.552 BILATERAL HIP PAIN: Primary | ICD-10-CM

## 2025-06-16 DIAGNOSIS — M25.519 SHOULDER PAIN, UNSPECIFIED CHRONICITY, UNSPECIFIED LATERALITY: ICD-10-CM

## 2025-06-16 DIAGNOSIS — M25.551 BILATERAL HIP PAIN: Primary | ICD-10-CM

## 2025-06-16 PROCEDURE — 73521 X-RAY EXAM HIPS BI 2 VIEWS: CPT

## 2025-06-16 PROCEDURE — 72110 X-RAY EXAM L-2 SPINE 4/>VWS: CPT

## 2025-06-16 PROCEDURE — 77002 NEEDLE LOCALIZATION BY XRAY: CPT | Performed by: PAIN MEDICINE

## 2025-06-16 PROCEDURE — 20610 DRAIN/INJ JOINT/BURSA W/O US: CPT | Performed by: PAIN MEDICINE

## 2025-06-16 PROCEDURE — 6360000002 HC RX W HCPCS: Performed by: PAIN MEDICINE

## 2025-06-16 RX ORDER — LIDOCAINE HYDROCHLORIDE 10 MG/ML
2 INJECTION, SOLUTION EPIDURAL; INFILTRATION; INTRACAUDAL; PERINEURAL ONCE
Status: COMPLETED | OUTPATIENT
Start: 2025-06-16 | End: 2025-06-16

## 2025-06-16 RX ORDER — BETAMETHASONE SODIUM PHOSPHATE AND BETAMETHASONE ACETATE 3; 3 MG/ML; MG/ML
6 INJECTION, SUSPENSION INTRA-ARTICULAR; INTRALESIONAL; INTRAMUSCULAR; SOFT TISSUE ONCE
Status: COMPLETED | OUTPATIENT
Start: 2025-06-16 | End: 2025-06-16

## 2025-06-16 RX ADMIN — BETAMETHASONE SODIUM PHOSPHATE AND BETAMETHASONE ACETATE 6 MG: 3; 3 INJECTION, SUSPENSION INTRA-ARTICULAR; INTRALESIONAL; INTRAMUSCULAR at 14:41

## 2025-06-16 RX ADMIN — LIDOCAINE HYDROCHLORIDE 2 ML: 10 INJECTION, SOLUTION EPIDURAL; INFILTRATION; INTRACAUDAL; PERINEURAL at 14:40

## 2025-06-16 ASSESSMENT — PAIN SCALES - GENERAL
PAINLEVEL_OUTOF10: 8
PAINLEVEL_OUTOF10: 8

## 2025-06-16 ASSESSMENT — PAIN DESCRIPTION - LOCATION
LOCATION: BACK
LOCATION: BACK

## 2025-06-16 NOTE — PROCEDURES
[facility]  Neurosurgery and Pain Management Center  [address], [address]  [place], [place]  P: [phone]  F: [phone]          Tenzin Flores is here for Bilateral sub-acromial bursa injection(s). Written informed consent obtained and area was cleaned with betadine and fluoroscopic guidance was used. 27 gauge 1.5 inch needle was used and advanced to the sub-acromial space without difficulty. After negative aspiration, 6mg of Betamethasone along with 2ml of 1% PF Lidocaine was injected without difficulty. Patient tolerated procedure well, no obvious complications occurred and sterile techniques were used throughout the procedure.This procedure was 30% more difficult and required 30% more work secondary to the patient's habitus. The patient has a BMI of 36 and has comorbidities of arthritis and obesity. This required increased work for safe and proper positioning upon the fluoroscopy table, increased needle passes for safe and appropriate needle placement, and increased fluoroscopy time and radiation exposure for proper visualization.        Patrick Coulter D.O.

## 2025-06-17 ENCOUNTER — HOSPITAL ENCOUNTER (OUTPATIENT)
Age: 50
Discharge: HOME | End: 2025-06-17
Payer: COMMERCIAL

## 2025-06-17 VITALS
TEMPERATURE: 97.5 F | BODY MASS INDEX: 36.26 KG/M2 | DIASTOLIC BLOOD PRESSURE: 70 MMHG | WEIGHT: 231 LBS | HEART RATE: 80 BPM | OXYGEN SATURATION: 100 % | HEIGHT: 67 IN | SYSTOLIC BLOOD PRESSURE: 130 MMHG

## 2025-06-17 DIAGNOSIS — R52 PAIN: ICD-10-CM

## 2025-06-17 PROCEDURE — 64483 NJX AA&/STRD TFRM EPI L/S 1: CPT | Performed by: PAIN MEDICINE

## 2025-06-17 PROCEDURE — 6360000004 HC RX CONTRAST MEDICATION: Performed by: PAIN MEDICINE

## 2025-06-17 PROCEDURE — 6360000002 HC RX W HCPCS: Performed by: PAIN MEDICINE

## 2025-06-17 PROCEDURE — 2500000003 HC RX 250 WO HCPCS: Performed by: PAIN MEDICINE

## 2025-06-17 RX ORDER — SODIUM CHLORIDE 9 MG/ML
1 INJECTION, SOLUTION INTRAMUSCULAR; INTRAVENOUS; SUBCUTANEOUS ONCE
Status: COMPLETED | OUTPATIENT
Start: 2025-06-17 | End: 2025-06-17

## 2025-06-17 RX ORDER — DEXAMETHASONE SODIUM PHOSPHATE 10 MG/ML
5 INJECTION, SOLUTION INTRAMUSCULAR; INTRAVENOUS ONCE
Status: COMPLETED | OUTPATIENT
Start: 2025-06-17 | End: 2025-06-17

## 2025-06-17 RX ADMIN — DEXAMETHASONE SODIUM PHOSPHATE 5 MG: 10 INJECTION, SOLUTION INTRAMUSCULAR; INTRAVENOUS at 13:32

## 2025-06-17 RX ADMIN — IOHEXOL 1 ML: 300 INJECTION, SOLUTION INTRAVENOUS at 13:33

## 2025-06-17 RX ADMIN — SODIUM CHLORIDE 1 ML: 9 INJECTION INTRAMUSCULAR; INTRAVENOUS; SUBCUTANEOUS at 13:33

## 2025-06-17 ASSESSMENT — PAIN SCALES - GENERAL
PAINLEVEL_OUTOF10: 10
PAINLEVEL_OUTOF10: 9

## 2025-06-17 ASSESSMENT — PAIN DESCRIPTION - LOCATION
LOCATION: BACK
LOCATION: BACK

## 2025-06-17 NOTE — PROCEDURES
[facility]  Neurosurgery and Pain Management Center  [address], [address]  [place], [place]  P: [phone]  F: [phone]      LUMBAR TRANSFORAMINAL INJECTION      Patient Name: Tenzin Flores : 1975  Date: 2025   Physician: FRANK HALL DO      Tenzin Flores  is here today for interventional pain management.    Preoperatively, the patient presents with radicular pain in the affected area as per history and exam. Patient has failed NSAIDs, PT, and conservative treatment. Patient has significant psychological and functional impairment due to this condition.  Standard ASIPP guidelines were followed and sterile technique used.  Area was cleaned with Betadine x3.  Informed consent was obtained.  Fluoroscopic guidance was used for this procedure.     TRANSFORAMINAL EPIDURAL  There was appropriate spread of contrast in the anterior epidural space and around the exiting nerve root. The 6 o’clock position of the pedicle was identified. Multiple views of fluoroscopy including lateral were used to confirm accurate needle placement of depth. Live fluoroscopy was used when injecting contrast to ensure no subdural or vascular spread. In total, approximately 5 mg of Dexamethasone and 1.5 ml of 0.9cc of normal saline was injected slowly without difficulty.This procedure was 30% more difficult and required 30% more work secondary to the patient's habitus. The patient has a BMI of 36 and has comorbidities of arthritis and obesity. This required increased work for safe and proper positioning upon the fluoroscopy table, increased needle passes for safe and appropriate needle placement, and increased fluoroscopy time and radiation exposure for proper visualization.        Patient tolerated the procedure well, no obvious complications occurred during the procedure.  Patient was appropriately monitored and discharged home in stable condition with their usual motor strength.

## 2025-07-14 DIAGNOSIS — G89.4 CHRONIC PAIN SYNDROME: ICD-10-CM

## 2025-07-14 DIAGNOSIS — M47.817 LUMBOSACRAL SPONDYLOSIS WITHOUT MYELOPATHY: ICD-10-CM

## 2025-07-14 RX ORDER — OXYCODONE AND ACETAMINOPHEN 10; 325 MG/1; MG/1
1 TABLET ORAL EVERY 6 HOURS PRN
Qty: 120 TABLET | Refills: 0 | Status: SHIPPED | OUTPATIENT
Start: 2025-07-14 | End: 2025-08-13

## 2025-07-14 NOTE — TELEPHONE ENCOUNTER
Requested Prescriptions     Pending Prescriptions Disp Refills    oxyCODONE-acetaminophen (PERCOCET)  MG per tablet 120 tablet 0     Sig: Take 1 tablet by mouth every 6 hours as needed for Pain for up to 30 days. Max Daily Amount: 4 tablets    tiZANidine (ZANAFLEX) 4 MG tablet 60 tablet 0     Sig: TAKE 1 TABLET BY MOUTH TWICE DAILY AS NEEDED FOR SPASMS       Patient last seen on:  6/17    Date of last refill:  5/17, 6/12    Reason for request:  PT out of medication and needs refill.     Request date for pharmacy pick-up:  7/14    Next office visit date: none

## 2025-08-11 DIAGNOSIS — G89.4 CHRONIC PAIN SYNDROME: ICD-10-CM

## 2025-08-11 RX ORDER — OXYCODONE AND ACETAMINOPHEN 10; 325 MG/1; MG/1
1 TABLET ORAL EVERY 6 HOURS PRN
Qty: 120 TABLET | Refills: 0 | Status: SHIPPED | OUTPATIENT
Start: 2025-08-11 | End: 2025-09-10